# Patient Record
Sex: MALE | Race: WHITE | NOT HISPANIC OR LATINO | Employment: OTHER | URBAN - NONMETROPOLITAN AREA
[De-identification: names, ages, dates, MRNs, and addresses within clinical notes are randomized per-mention and may not be internally consistent; named-entity substitution may affect disease eponyms.]

---

## 2022-05-18 ENCOUNTER — APPOINTMENT (OUTPATIENT)
Dept: GENERAL RADIOLOGY | Facility: HOSPITAL | Age: 72
End: 2022-05-18

## 2022-05-18 ENCOUNTER — HOSPITAL ENCOUNTER (EMERGENCY)
Facility: HOSPITAL | Age: 72
Discharge: PSYCHIATRIC OR SUBSTANCE USE FACILITY | End: 2022-05-19
Attending: EMERGENCY MEDICINE | Admitting: EMERGENCY MEDICINE

## 2022-05-18 DIAGNOSIS — Z78.9 ALCOHOL USE: Primary | ICD-10-CM

## 2022-05-18 LAB
AMPHET+METHAMPHET UR QL: NEGATIVE
AMPHETAMINES UR QL: NEGATIVE
BARBITURATES UR QL SCN: NEGATIVE
BENZODIAZ UR QL SCN: NEGATIVE
BILIRUB UR QL STRIP: NEGATIVE
BUPRENORPHINE SERPL-MCNC: NEGATIVE NG/ML
CANNABINOIDS SERPL QL: NEGATIVE
CLARITY UR: CLEAR
COCAINE UR QL: NEGATIVE
COLOR UR: ABNORMAL
FLUAV RNA RESP QL NAA+PROBE: NOT DETECTED
FLUBV RNA RESP QL NAA+PROBE: NOT DETECTED
GLUCOSE UR STRIP-MCNC: NEGATIVE MG/DL
HGB UR QL STRIP.AUTO: NEGATIVE
KETONES UR QL STRIP: ABNORMAL
LEUKOCYTE ESTERASE UR QL STRIP.AUTO: NEGATIVE
METHADONE UR QL SCN: NEGATIVE
NITRITE UR QL STRIP: NEGATIVE
OPIATES UR QL: NEGATIVE
OXYCODONE UR QL SCN: NEGATIVE
PCP UR QL SCN: NEGATIVE
PH UR STRIP.AUTO: 5.5 [PH] (ref 5–8)
PROPOXYPH UR QL: NEGATIVE
PROT UR QL STRIP: NEGATIVE
SARS-COV-2 RNA RESP QL NAA+PROBE: NOT DETECTED
SP GR UR STRIP: 1.02 (ref 1–1.03)
TRICYCLICS UR QL SCN: NEGATIVE
UROBILINOGEN UR QL STRIP: ABNORMAL

## 2022-05-18 PROCEDURE — 86140 C-REACTIVE PROTEIN: CPT | Performed by: EMERGENCY MEDICINE

## 2022-05-18 PROCEDURE — 87636 SARSCOV2 & INF A&B AMP PRB: CPT | Performed by: EMERGENCY MEDICINE

## 2022-05-18 PROCEDURE — 83690 ASSAY OF LIPASE: CPT | Performed by: EMERGENCY MEDICINE

## 2022-05-18 PROCEDURE — 80143 DRUG ASSAY ACETAMINOPHEN: CPT | Performed by: EMERGENCY MEDICINE

## 2022-05-18 PROCEDURE — 84443 ASSAY THYROID STIM HORMONE: CPT | Performed by: EMERGENCY MEDICINE

## 2022-05-18 PROCEDURE — 85025 COMPLETE CBC W/AUTO DIFF WBC: CPT | Performed by: EMERGENCY MEDICINE

## 2022-05-18 PROCEDURE — 80179 DRUG ASSAY SALICYLATE: CPT | Performed by: EMERGENCY MEDICINE

## 2022-05-18 PROCEDURE — 93005 ELECTROCARDIOGRAM TRACING: CPT | Performed by: EMERGENCY MEDICINE

## 2022-05-18 PROCEDURE — 83735 ASSAY OF MAGNESIUM: CPT | Performed by: EMERGENCY MEDICINE

## 2022-05-18 PROCEDURE — 71045 X-RAY EXAM CHEST 1 VIEW: CPT

## 2022-05-18 PROCEDURE — 81003 URINALYSIS AUTO W/O SCOPE: CPT | Performed by: EMERGENCY MEDICINE

## 2022-05-18 PROCEDURE — 82077 ASSAY SPEC XCP UR&BREATH IA: CPT | Performed by: EMERGENCY MEDICINE

## 2022-05-18 PROCEDURE — 99285 EMERGENCY DEPT VISIT HI MDM: CPT

## 2022-05-18 PROCEDURE — 80306 DRUG TEST PRSMV INSTRMNT: CPT | Performed by: EMERGENCY MEDICINE

## 2022-05-18 PROCEDURE — 80053 COMPREHEN METABOLIC PANEL: CPT | Performed by: EMERGENCY MEDICINE

## 2022-05-18 PROCEDURE — 36415 COLL VENOUS BLD VENIPUNCTURE: CPT

## 2022-05-18 PROCEDURE — 85652 RBC SED RATE AUTOMATED: CPT | Performed by: EMERGENCY MEDICINE

## 2022-05-19 ENCOUNTER — APPOINTMENT (OUTPATIENT)
Dept: CT IMAGING | Facility: HOSPITAL | Age: 72
End: 2022-05-19

## 2022-05-19 ENCOUNTER — HOSPITAL ENCOUNTER (INPATIENT)
Facility: HOSPITAL | Age: 72
LOS: 5 days | Discharge: HOME | End: 2022-05-24
Attending: PSYCHIATRY & NEUROLOGY | Admitting: PSYCHIATRY & NEUROLOGY

## 2022-05-19 VITALS
HEIGHT: 70 IN | WEIGHT: 160 LBS | OXYGEN SATURATION: 98 % | DIASTOLIC BLOOD PRESSURE: 79 MMHG | BODY MASS INDEX: 22.9 KG/M2 | HEART RATE: 68 BPM | TEMPERATURE: 97.6 F | RESPIRATION RATE: 18 BRPM | SYSTOLIC BLOOD PRESSURE: 153 MMHG

## 2022-05-19 PROBLEM — F19.20 CHEMICAL DEPENDENCY: Status: ACTIVE | Noted: 2022-05-19

## 2022-05-19 LAB
ALBUMIN SERPL-MCNC: 3.52 G/DL (ref 3.5–5.2)
ALBUMIN SERPL-MCNC: 3.87 G/DL (ref 3.5–5.2)
ALBUMIN/GLOB SERPL: 1 G/DL
ALBUMIN/GLOB SERPL: 1.1 G/DL
ALP SERPL-CCNC: 100 U/L (ref 39–117)
ALP SERPL-CCNC: 93 U/L (ref 39–117)
ALT SERPL W P-5'-P-CCNC: 36 U/L (ref 1–41)
ALT SERPL W P-5'-P-CCNC: 45 U/L (ref 1–41)
ANION GAP SERPL CALCULATED.3IONS-SCNC: 11.4 MMOL/L (ref 5–15)
ANION GAP SERPL CALCULATED.3IONS-SCNC: 9.6 MMOL/L (ref 5–15)
APAP SERPL-MCNC: <5 MCG/ML (ref 0–30)
AST SERPL-CCNC: 50 U/L (ref 1–40)
AST SERPL-CCNC: 67 U/L (ref 1–40)
BASOPHILS # BLD AUTO: 0.07 10*3/MM3 (ref 0–0.2)
BASOPHILS # BLD AUTO: 0.09 10*3/MM3 (ref 0–0.2)
BASOPHILS NFR BLD AUTO: 1.2 % (ref 0–1.5)
BASOPHILS NFR BLD AUTO: 1.3 % (ref 0–1.5)
BILIRUB SERPL-MCNC: 0.4 MG/DL (ref 0–1.2)
BILIRUB SERPL-MCNC: 0.4 MG/DL (ref 0–1.2)
BUN SERPL-MCNC: 12 MG/DL (ref 8–23)
BUN SERPL-MCNC: 14 MG/DL (ref 8–23)
BUN/CREAT SERPL: 14.6 (ref 7–25)
BUN/CREAT SERPL: 17.1 (ref 7–25)
CALCIUM SPEC-SCNC: 9.8 MG/DL (ref 8.6–10.5)
CALCIUM SPEC-SCNC: 9.9 MG/DL (ref 8.6–10.5)
CHLORIDE SERPL-SCNC: 97 MMOL/L (ref 98–107)
CHLORIDE SERPL-SCNC: 98 MMOL/L (ref 98–107)
CHOLEST SERPL-MCNC: 107 MG/DL (ref 0–200)
CK MB SERPL-CCNC: 4.83 NG/ML
CK SERPL-CCNC: 104 U/L (ref 20–200)
CO2 SERPL-SCNC: 22.6 MMOL/L (ref 22–29)
CO2 SERPL-SCNC: 23.4 MMOL/L (ref 22–29)
CREAT SERPL-MCNC: 0.7 MG/DL (ref 0.76–1.27)
CREAT SERPL-MCNC: 0.96 MG/DL (ref 0.76–1.27)
CRP SERPL-MCNC: <0.3 MG/DL (ref 0–0.5)
D-LACTATE SERPL-SCNC: 1.1 MMOL/L (ref 0.5–2)
D-LACTATE SERPL-SCNC: 2.1 MMOL/L (ref 0.5–2)
DEPRECATED RDW RBC AUTO: 46.3 FL (ref 37–54)
DEPRECATED RDW RBC AUTO: 46.8 FL (ref 37–54)
EGFRCR SERPLBLD CKD-EPI 2021: 82.9 ML/MIN/1.73
EGFRCR SERPLBLD CKD-EPI 2021: 96.7 ML/MIN/1.73
EOSINOPHIL # BLD AUTO: 0.09 10*3/MM3 (ref 0–0.4)
EOSINOPHIL # BLD AUTO: 0.18 10*3/MM3 (ref 0–0.4)
EOSINOPHIL NFR BLD AUTO: 1.5 % (ref 0.3–6.2)
EOSINOPHIL NFR BLD AUTO: 2.7 % (ref 0.3–6.2)
ERYTHROCYTE [DISTWIDTH] IN BLOOD BY AUTOMATED COUNT: 14.9 % (ref 12.3–15.4)
ERYTHROCYTE [DISTWIDTH] IN BLOOD BY AUTOMATED COUNT: 15 % (ref 12.3–15.4)
ERYTHROCYTE [SEDIMENTATION RATE] IN BLOOD: 19 MM/HR (ref 0–20)
ETHANOL BLD-MCNC: <10 MG/DL (ref 0–10)
ETHANOL UR QL: <0.01 %
GLOBULIN UR ELPH-MCNC: 3.3 GM/DL
GLOBULIN UR ELPH-MCNC: 3.7 GM/DL
GLUCOSE BLDC GLUCOMTR-MCNC: 182 MG/DL (ref 70–130)
GLUCOSE SERPL-MCNC: 144 MG/DL (ref 65–99)
GLUCOSE SERPL-MCNC: 99 MG/DL (ref 65–99)
HBA1C MFR BLD: 5.3 % (ref 4.8–5.6)
HCT VFR BLD AUTO: 40.1 % (ref 37.5–51)
HCT VFR BLD AUTO: 44.8 % (ref 37.5–51)
HDLC SERPL-MCNC: 57 MG/DL (ref 40–60)
HGB BLD-MCNC: 13.3 G/DL (ref 13–17.7)
HGB BLD-MCNC: 14.6 G/DL (ref 13–17.7)
HOLD SPECIMEN: NORMAL
HOLD SPECIMEN: NORMAL
IMM GRANULOCYTES # BLD AUTO: 0.02 10*3/MM3 (ref 0–0.05)
IMM GRANULOCYTES # BLD AUTO: 0.02 10*3/MM3 (ref 0–0.05)
IMM GRANULOCYTES NFR BLD AUTO: 0.3 % (ref 0–0.5)
IMM GRANULOCYTES NFR BLD AUTO: 0.3 % (ref 0–0.5)
LDLC SERPL CALC-MCNC: 39 MG/DL (ref 0–100)
LDLC/HDLC SERPL: 0.74 {RATIO}
LIPASE SERPL-CCNC: 20 U/L (ref 13–60)
LYMPHOCYTES # BLD AUTO: 0.83 10*3/MM3 (ref 0.7–3.1)
LYMPHOCYTES # BLD AUTO: 1.11 10*3/MM3 (ref 0.7–3.1)
LYMPHOCYTES NFR BLD AUTO: 13.9 % (ref 19.6–45.3)
LYMPHOCYTES NFR BLD AUTO: 16.6 % (ref 19.6–45.3)
MAGNESIUM SERPL-MCNC: 2 MG/DL (ref 1.6–2.4)
MCH RBC QN AUTO: 28 PG (ref 26.6–33)
MCH RBC QN AUTO: 28.2 PG (ref 26.6–33)
MCHC RBC AUTO-ENTMCNC: 32.6 G/DL (ref 31.5–35.7)
MCHC RBC AUTO-ENTMCNC: 33.2 G/DL (ref 31.5–35.7)
MCV RBC AUTO: 85.1 FL (ref 79–97)
MCV RBC AUTO: 85.8 FL (ref 79–97)
MONOCYTES # BLD AUTO: 0.64 10*3/MM3 (ref 0.1–0.9)
MONOCYTES # BLD AUTO: 0.69 10*3/MM3 (ref 0.1–0.9)
MONOCYTES NFR BLD AUTO: 11.5 % (ref 5–12)
MONOCYTES NFR BLD AUTO: 9.6 % (ref 5–12)
MYOGLOBIN SERPL-MCNC: 40.4 NG/ML (ref 28–72)
NEUTROPHILS NFR BLD AUTO: 4.28 10*3/MM3 (ref 1.7–7)
NEUTROPHILS NFR BLD AUTO: 4.65 10*3/MM3 (ref 1.7–7)
NEUTROPHILS NFR BLD AUTO: 69.5 % (ref 42.7–76)
NEUTROPHILS NFR BLD AUTO: 71.6 % (ref 42.7–76)
NRBC BLD AUTO-RTO: 0 /100 WBC (ref 0–0.2)
NRBC BLD AUTO-RTO: 0 /100 WBC (ref 0–0.2)
NT-PROBNP SERPL-MCNC: 514.5 PG/ML (ref 0–900)
PLATELET # BLD AUTO: 159 10*3/MM3 (ref 140–450)
PLATELET # BLD AUTO: 182 10*3/MM3 (ref 140–450)
PMV BLD AUTO: 10.3 FL (ref 6–12)
PMV BLD AUTO: 9.6 FL (ref 6–12)
POTASSIUM SERPL-SCNC: 4 MMOL/L (ref 3.5–5.2)
POTASSIUM SERPL-SCNC: 4.4 MMOL/L (ref 3.5–5.2)
PROT SERPL-MCNC: 6.8 G/DL (ref 6–8.5)
PROT SERPL-MCNC: 7.6 G/DL (ref 6–8.5)
RBC # BLD AUTO: 4.71 10*6/MM3 (ref 4.14–5.8)
RBC # BLD AUTO: 5.22 10*6/MM3 (ref 4.14–5.8)
SALICYLATES SERPL-MCNC: <0.3 MG/DL
SODIUM SERPL-SCNC: 131 MMOL/L (ref 136–145)
SODIUM SERPL-SCNC: 131 MMOL/L (ref 136–145)
TRIGL SERPL-MCNC: 38 MG/DL (ref 0–150)
TROPONIN T SERPL-MCNC: <0.01 NG/ML (ref 0–0.03)
TSH SERPL DL<=0.05 MIU/L-ACNC: 2.12 UIU/ML (ref 0.27–4.2)
VLDLC SERPL-MCNC: 11 MG/DL (ref 5–40)
WBC NRBC COR # BLD: 5.98 10*3/MM3 (ref 3.4–10.8)
WBC NRBC COR # BLD: 6.69 10*3/MM3 (ref 3.4–10.8)
WHOLE BLOOD HOLD COAG: NORMAL
WHOLE BLOOD HOLD SPECIMEN: NORMAL

## 2022-05-19 PROCEDURE — 94799 UNLISTED PULMONARY SVC/PX: CPT

## 2022-05-19 PROCEDURE — 80053 COMPREHEN METABOLIC PANEL: CPT | Performed by: PHYSICIAN ASSISTANT

## 2022-05-19 PROCEDURE — 63710000001 ONDANSETRON ODT 4 MG TABLET DISPERSIBLE: Performed by: EMERGENCY MEDICINE

## 2022-05-19 PROCEDURE — 84484 ASSAY OF TROPONIN QUANT: CPT | Performed by: PHYSICIAN ASSISTANT

## 2022-05-19 PROCEDURE — 99222 1ST HOSP IP/OBS MODERATE 55: CPT | Performed by: NURSE PRACTITIONER

## 2022-05-19 PROCEDURE — 93010 ELECTROCARDIOGRAM REPORT: CPT | Performed by: SPECIALIST

## 2022-05-19 PROCEDURE — 83880 ASSAY OF NATRIURETIC PEPTIDE: CPT | Performed by: PSYCHIATRY & NEUROLOGY

## 2022-05-19 PROCEDURE — 99223 1ST HOSP IP/OBS HIGH 75: CPT | Performed by: PSYCHIATRY & NEUROLOGY

## 2022-05-19 PROCEDURE — 84484 ASSAY OF TROPONIN QUANT: CPT | Performed by: PSYCHIATRY & NEUROLOGY

## 2022-05-19 PROCEDURE — 93005 ELECTROCARDIOGRAM TRACING: CPT | Performed by: PSYCHIATRY & NEUROLOGY

## 2022-05-19 PROCEDURE — 70450 CT HEAD/BRAIN W/O DYE: CPT | Performed by: RADIOLOGY

## 2022-05-19 PROCEDURE — 86141 C-REACTIVE PROTEIN HS: CPT | Performed by: PSYCHIATRY & NEUROLOGY

## 2022-05-19 PROCEDURE — 83605 ASSAY OF LACTIC ACID: CPT | Performed by: PHYSICIAN ASSISTANT

## 2022-05-19 PROCEDURE — 99223 1ST HOSP IP/OBS HIGH 75: CPT | Performed by: PHYSICIAN ASSISTANT

## 2022-05-19 PROCEDURE — 70450 CT HEAD/BRAIN W/O DYE: CPT

## 2022-05-19 PROCEDURE — 63710000001 ONDANSETRON PER 8 MG: Performed by: PSYCHIATRY & NEUROLOGY

## 2022-05-19 PROCEDURE — 82962 GLUCOSE BLOOD TEST: CPT

## 2022-05-19 PROCEDURE — 82550 ASSAY OF CK (CPK): CPT | Performed by: PSYCHIATRY & NEUROLOGY

## 2022-05-19 PROCEDURE — 83036 HEMOGLOBIN GLYCOSYLATED A1C: CPT | Performed by: PHYSICIAN ASSISTANT

## 2022-05-19 PROCEDURE — 83874 ASSAY OF MYOGLOBIN: CPT | Performed by: PSYCHIATRY & NEUROLOGY

## 2022-05-19 PROCEDURE — 85025 COMPLETE CBC W/AUTO DIFF WBC: CPT | Performed by: PHYSICIAN ASSISTANT

## 2022-05-19 PROCEDURE — 82553 CREATINE MB FRACTION: CPT | Performed by: PSYCHIATRY & NEUROLOGY

## 2022-05-19 PROCEDURE — HZ2ZZZZ DETOXIFICATION SERVICES FOR SUBSTANCE ABUSE TREATMENT: ICD-10-PCS | Performed by: PSYCHIATRY & NEUROLOGY

## 2022-05-19 PROCEDURE — 80061 LIPID PANEL: CPT | Performed by: PHYSICIAN ASSISTANT

## 2022-05-19 RX ORDER — FAMOTIDINE 20 MG/1
20 TABLET, FILM COATED ORAL
Status: DISCONTINUED | OUTPATIENT
Start: 2022-05-19 | End: 2022-05-24 | Stop reason: HOSPADM

## 2022-05-19 RX ORDER — ONDANSETRON 2 MG/ML
4 INJECTION INTRAMUSCULAR; INTRAVENOUS ONCE
Status: DISCONTINUED | OUTPATIENT
Start: 2022-05-19 | End: 2022-05-24 | Stop reason: HOSPADM

## 2022-05-19 RX ORDER — LORAZEPAM 2 MG/1
2 TABLET ORAL
Status: DISPENSED | OUTPATIENT
Start: 2022-05-19 | End: 2022-05-20

## 2022-05-19 RX ORDER — ASPIRIN 325 MG
325 TABLET, DELAYED RELEASE (ENTERIC COATED) ORAL ONCE
Status: COMPLETED | OUTPATIENT
Start: 2022-05-19 | End: 2022-05-19

## 2022-05-19 RX ORDER — IBUPROFEN 400 MG/1
400 TABLET ORAL EVERY 6 HOURS PRN
Status: DISCONTINUED | OUTPATIENT
Start: 2022-05-19 | End: 2022-05-24 | Stop reason: HOSPADM

## 2022-05-19 RX ORDER — LORAZEPAM 1 MG/1
1 TABLET ORAL EVERY 4 HOURS PRN
Status: ACTIVE | OUTPATIENT
Start: 2022-05-22 | End: 2022-05-23

## 2022-05-19 RX ORDER — LORAZEPAM 0.5 MG/1
0.5 TABLET ORAL EVERY 4 HOURS PRN
Status: ACTIVE | OUTPATIENT
Start: 2022-05-23 | End: 2022-05-24

## 2022-05-19 RX ORDER — NITROGLYCERIN 0.4 MG/1
0.4 TABLET SUBLINGUAL
Status: DISCONTINUED | OUTPATIENT
Start: 2022-05-19 | End: 2022-05-24 | Stop reason: HOSPADM

## 2022-05-19 RX ORDER — PHENOBARBITAL, HYOSCYAMINE SULFATE, ATROPINE SULFATE AND SCOPOLAMINE HYDROBROMIDE .0194; .1037; 16.2; .0065 MG/1; MG/1; MG/1; MG/1
2 TABLET ORAL ONCE
Status: COMPLETED | OUTPATIENT
Start: 2022-05-19 | End: 2022-05-19

## 2022-05-19 RX ORDER — TRAZODONE HYDROCHLORIDE 50 MG/1
50 TABLET ORAL NIGHTLY PRN
Status: DISCONTINUED | OUTPATIENT
Start: 2022-05-19 | End: 2022-05-24 | Stop reason: HOSPADM

## 2022-05-19 RX ORDER — ATORVASTATIN CALCIUM 40 MG/1
40 TABLET, FILM COATED ORAL NIGHTLY
Status: DISCONTINUED | OUTPATIENT
Start: 2022-05-19 | End: 2022-05-24 | Stop reason: HOSPADM

## 2022-05-19 RX ORDER — CLOPIDOGREL BISULFATE 75 MG/1
75 TABLET ORAL DAILY
Status: DISCONTINUED | OUTPATIENT
Start: 2022-05-19 | End: 2022-05-24 | Stop reason: HOSPADM

## 2022-05-19 RX ORDER — ALUMINA, MAGNESIA, AND SIMETHICONE 2400; 2400; 240 MG/30ML; MG/30ML; MG/30ML
15 SUSPENSION ORAL EVERY 6 HOURS PRN
Status: DISCONTINUED | OUTPATIENT
Start: 2022-05-19 | End: 2022-05-24 | Stop reason: HOSPADM

## 2022-05-19 RX ORDER — FAMOTIDINE 20 MG/1
20 TABLET, FILM COATED ORAL 2 TIMES DAILY PRN
Status: DISCONTINUED | OUTPATIENT
Start: 2022-05-19 | End: 2022-05-19

## 2022-05-19 RX ORDER — LORAZEPAM 1 MG/1
1 TABLET ORAL
Status: COMPLETED | OUTPATIENT
Start: 2022-05-22 | End: 2022-05-22

## 2022-05-19 RX ORDER — KETOROLAC TROMETHAMINE 10 MG/1
10 TABLET, FILM COATED ORAL ONCE
Status: DISCONTINUED | OUTPATIENT
Start: 2022-05-19 | End: 2022-05-19 | Stop reason: HOSPADM

## 2022-05-19 RX ORDER — LOPERAMIDE HYDROCHLORIDE 2 MG/1
2 CAPSULE ORAL
Status: DISCONTINUED | OUTPATIENT
Start: 2022-05-19 | End: 2022-05-24 | Stop reason: HOSPADM

## 2022-05-19 RX ORDER — LORAZEPAM 2 MG/1
2 TABLET ORAL
Status: COMPLETED | OUTPATIENT
Start: 2022-05-20 | End: 2022-05-20

## 2022-05-19 RX ORDER — MULTIVITAMIN WITH IRON
2 TABLET ORAL DAILY
Status: DISCONTINUED | OUTPATIENT
Start: 2022-05-19 | End: 2022-05-24 | Stop reason: HOSPADM

## 2022-05-19 RX ORDER — LORAZEPAM 2 MG/1
2 TABLET ORAL EVERY 4 HOURS PRN
Status: ACTIVE | OUTPATIENT
Start: 2022-05-20 | End: 2022-05-21

## 2022-05-19 RX ORDER — LIDOCAINE HYDROCHLORIDE 20 MG/ML
15 SOLUTION OROPHARYNGEAL ONCE
Status: COMPLETED | OUTPATIENT
Start: 2022-05-19 | End: 2022-05-19

## 2022-05-19 RX ORDER — ONDANSETRON 4 MG/1
4 TABLET, ORALLY DISINTEGRATING ORAL ONCE
Status: COMPLETED | OUTPATIENT
Start: 2022-05-19 | End: 2022-05-19

## 2022-05-19 RX ORDER — ECHINACEA PURPUREA EXTRACT 125 MG
2 TABLET ORAL AS NEEDED
Status: DISCONTINUED | OUTPATIENT
Start: 2022-05-19 | End: 2022-05-24 | Stop reason: HOSPADM

## 2022-05-19 RX ORDER — ONDANSETRON 4 MG/1
4 TABLET, FILM COATED ORAL EVERY 6 HOURS PRN
Status: DISCONTINUED | OUTPATIENT
Start: 2022-05-19 | End: 2022-05-24 | Stop reason: HOSPADM

## 2022-05-19 RX ORDER — LORAZEPAM 0.5 MG/1
0.5 TABLET ORAL
Status: COMPLETED | OUTPATIENT
Start: 2022-05-23 | End: 2022-05-23

## 2022-05-19 RX ORDER — HYDROXYZINE 50 MG/1
50 TABLET, FILM COATED ORAL EVERY 6 HOURS PRN
Status: DISCONTINUED | OUTPATIENT
Start: 2022-05-19 | End: 2022-05-24 | Stop reason: HOSPADM

## 2022-05-19 RX ORDER — ASPIRIN 81 MG/1
81 TABLET ORAL DAILY
Status: DISCONTINUED | OUTPATIENT
Start: 2022-05-20 | End: 2022-05-19

## 2022-05-19 RX ORDER — ALUMINA, MAGNESIA, AND SIMETHICONE 2400; 2400; 240 MG/30ML; MG/30ML; MG/30ML
15 SUSPENSION ORAL ONCE
Status: COMPLETED | OUTPATIENT
Start: 2022-05-19 | End: 2022-05-19

## 2022-05-19 RX ORDER — ACETAMINOPHEN 500 MG
1000 TABLET ORAL EVERY 8 HOURS
Status: DISCONTINUED | OUTPATIENT
Start: 2022-05-19 | End: 2022-05-24 | Stop reason: HOSPADM

## 2022-05-19 RX ORDER — NICOTINE 21 MG/24HR
1 PATCH, TRANSDERMAL 24 HOURS TRANSDERMAL
Status: DISCONTINUED | OUTPATIENT
Start: 2022-05-19 | End: 2022-05-24 | Stop reason: HOSPADM

## 2022-05-19 RX ORDER — BENZONATATE 100 MG/1
100 CAPSULE ORAL 3 TIMES DAILY PRN
Status: DISCONTINUED | OUTPATIENT
Start: 2022-05-19 | End: 2022-05-24 | Stop reason: HOSPADM

## 2022-05-19 RX ORDER — ONDANSETRON 2 MG/ML
4 INJECTION INTRAMUSCULAR; INTRAVENOUS EVERY 6 HOURS PRN
Status: DISCONTINUED | OUTPATIENT
Start: 2022-05-19 | End: 2022-05-24 | Stop reason: HOSPADM

## 2022-05-19 RX ADMIN — IBUPROFEN 400 MG: 400 TABLET, FILM COATED ORAL at 02:52

## 2022-05-19 RX ADMIN — ALUMINUM HYDROXIDE, MAGNESIUM HYDROXIDE, AND DIMETHICONE 15 ML: 400; 400; 40 SUSPENSION ORAL at 16:12

## 2022-05-19 RX ADMIN — ALUMINUM HYDROXIDE, MAGNESIUM HYDROXIDE, AND DIMETHICONE 15 ML: 400; 400; 40 SUSPENSION ORAL at 21:31

## 2022-05-19 RX ADMIN — METOPROLOL TARTRATE 25 MG: 25 TABLET, FILM COATED ORAL at 17:12

## 2022-05-19 RX ADMIN — LORAZEPAM 2 MG: 2 TABLET ORAL at 08:05

## 2022-05-19 RX ADMIN — LORAZEPAM 2 MG: 2 TABLET ORAL at 18:06

## 2022-05-19 RX ADMIN — LORAZEPAM 2 MG: 2 TABLET ORAL at 02:52

## 2022-05-19 RX ADMIN — ASPIRIN 325 MG: 325 TABLET, COATED ORAL at 14:30

## 2022-05-19 RX ADMIN — SODIUM CHLORIDE 1000 ML: 9 INJECTION, SOLUTION INTRAVENOUS at 14:45

## 2022-05-19 RX ADMIN — NITROGLYCERIN 0.4 MG: 0.4 TABLET, ORALLY DISINTEGRATING SUBLINGUAL at 13:47

## 2022-05-19 RX ADMIN — IBUPROFEN 400 MG: 400 TABLET, FILM COATED ORAL at 21:31

## 2022-05-19 RX ADMIN — TRAZODONE HYDROCHLORIDE 50 MG: 50 TABLET ORAL at 21:31

## 2022-05-19 RX ADMIN — LIDOCAINE HYDROCHLORIDE 15 ML: 20 SOLUTION ORAL; TOPICAL at 16:12

## 2022-05-19 RX ADMIN — HYDROXYZINE HYDROCHLORIDE 50 MG: 50 TABLET ORAL at 21:31

## 2022-05-19 RX ADMIN — FAMOTIDINE 20 MG: 20 TABLET, FILM COATED ORAL at 17:13

## 2022-05-19 RX ADMIN — ONDANSETRON HYDROCHLORIDE 4 MG: 4 TABLET, FILM COATED ORAL at 08:06

## 2022-05-19 RX ADMIN — ACETAMINOPHEN 1000 MG: 500 TABLET ORAL at 16:12

## 2022-05-19 RX ADMIN — LORAZEPAM 2 MG: 2 TABLET ORAL at 12:56

## 2022-05-19 RX ADMIN — ONDANSETRON 4 MG: 4 TABLET, ORALLY DISINTEGRATING ORAL at 01:10

## 2022-05-19 RX ADMIN — ATORVASTATIN CALCIUM 40 MG: 40 TABLET, FILM COATED ORAL at 21:32

## 2022-05-19 RX ADMIN — CLOPIDOGREL 75 MG: 75 TABLET, FILM COATED ORAL at 17:13

## 2022-05-19 RX ADMIN — Medication 100 MG: at 08:06

## 2022-05-19 RX ADMIN — PHENOBARBITAL, HYOSCYAMINE SULFATE, ATROPINE SULFATE, SCOPOLAMINE HYDROBROMIDE 32.4 MG: 16.2; .1037; .0194; .0065 TABLET ORAL at 16:12

## 2022-05-19 RX ADMIN — Medication 2 TABLET: at 08:05

## 2022-05-20 LAB
CRP SERPL-MCNC: 0.27 MG/DL (ref 0.01–0.5)
QT INTERVAL: 378 MS
QT INTERVAL: 430 MS
QTC INTERVAL: 449 MS
QTC INTERVAL: 454 MS

## 2022-05-20 PROCEDURE — 99232 SBSQ HOSP IP/OBS MODERATE 35: CPT | Performed by: PHYSICIAN ASSISTANT

## 2022-05-20 PROCEDURE — 99232 SBSQ HOSP IP/OBS MODERATE 35: CPT | Performed by: PSYCHIATRY & NEUROLOGY

## 2022-05-20 RX ORDER — BUDESONIDE AND FORMOTEROL FUMARATE DIHYDRATE 160; 4.5 UG/1; UG/1
2 AEROSOL RESPIRATORY (INHALATION)
Status: DISCONTINUED | OUTPATIENT
Start: 2022-05-20 | End: 2022-05-24 | Stop reason: HOSPADM

## 2022-05-20 RX ADMIN — BUDESONIDE AND FORMOTEROL FUMARATE DIHYDRATE 2 PUFF: 160; 4.5 AEROSOL RESPIRATORY (INHALATION) at 16:09

## 2022-05-20 RX ADMIN — ACETAMINOPHEN 1000 MG: 500 TABLET ORAL at 08:57

## 2022-05-20 RX ADMIN — Medication 2 TABLET: at 08:56

## 2022-05-20 RX ADMIN — ACETAMINOPHEN 1000 MG: 500 TABLET ORAL at 16:26

## 2022-05-20 RX ADMIN — BUDESONIDE AND FORMOTEROL FUMARATE DIHYDRATE 2 PUFF: 160; 4.5 AEROSOL RESPIRATORY (INHALATION) at 22:39

## 2022-05-20 RX ADMIN — LORAZEPAM 2 MG: 2 TABLET ORAL at 14:06

## 2022-05-20 RX ADMIN — CLOPIDOGREL 75 MG: 75 TABLET, FILM COATED ORAL at 08:56

## 2022-05-20 RX ADMIN — LORAZEPAM 2 MG: 2 TABLET ORAL at 08:56

## 2022-05-20 RX ADMIN — FAMOTIDINE 20 MG: 20 TABLET, FILM COATED ORAL at 08:56

## 2022-05-20 RX ADMIN — Medication 100 MG: at 08:56

## 2022-05-20 RX ADMIN — ATORVASTATIN CALCIUM 40 MG: 40 TABLET, FILM COATED ORAL at 22:39

## 2022-05-20 RX ADMIN — METOPROLOL TARTRATE 25 MG: 25 TABLET, FILM COATED ORAL at 08:56

## 2022-05-20 RX ADMIN — Medication 1 PATCH: at 08:59

## 2022-05-20 RX ADMIN — LORAZEPAM 2 MG: 2 TABLET ORAL at 22:39

## 2022-05-20 RX ADMIN — METOPROLOL TARTRATE 25 MG: 25 TABLET, FILM COATED ORAL at 22:39

## 2022-05-21 PROCEDURE — 63710000001 ONDANSETRON PER 8 MG: Performed by: PSYCHIATRY & NEUROLOGY

## 2022-05-21 PROCEDURE — 99231 SBSQ HOSP IP/OBS SF/LOW 25: CPT | Performed by: PSYCHIATRY & NEUROLOGY

## 2022-05-21 RX ORDER — ALBUTEROL SULFATE 90 UG/1
2 AEROSOL, METERED RESPIRATORY (INHALATION) EVERY 6 HOURS PRN
Status: DISCONTINUED | OUTPATIENT
Start: 2022-05-21 | End: 2022-05-24 | Stop reason: HOSPADM

## 2022-05-21 RX ADMIN — Medication 100 MG: at 08:22

## 2022-05-21 RX ADMIN — Medication 2 TABLET: at 08:22

## 2022-05-21 RX ADMIN — FAMOTIDINE 20 MG: 20 TABLET, FILM COATED ORAL at 16:38

## 2022-05-21 RX ADMIN — METOPROLOL TARTRATE 25 MG: 25 TABLET, FILM COATED ORAL at 21:45

## 2022-05-21 RX ADMIN — FAMOTIDINE 20 MG: 20 TABLET, FILM COATED ORAL at 08:22

## 2022-05-21 RX ADMIN — LORAZEPAM 1.5 MG: 1 TABLET ORAL at 08:22

## 2022-05-21 RX ADMIN — METOPROLOL TARTRATE 25 MG: 25 TABLET, FILM COATED ORAL at 08:22

## 2022-05-21 RX ADMIN — CLOPIDOGREL 75 MG: 75 TABLET, FILM COATED ORAL at 08:22

## 2022-05-21 RX ADMIN — LORAZEPAM 1.5 MG: 1 TABLET ORAL at 14:39

## 2022-05-21 RX ADMIN — ACETAMINOPHEN 1000 MG: 500 TABLET ORAL at 08:22

## 2022-05-21 RX ADMIN — ALBUTEROL SULFATE 2 PUFF: 90 AEROSOL, METERED RESPIRATORY (INHALATION) at 20:13

## 2022-05-21 RX ADMIN — LORAZEPAM 1.5 MG: 1 TABLET ORAL at 21:45

## 2022-05-21 RX ADMIN — BUDESONIDE AND FORMOTEROL FUMARATE DIHYDRATE 2 PUFF: 160; 4.5 AEROSOL RESPIRATORY (INHALATION) at 21:45

## 2022-05-21 RX ADMIN — ATORVASTATIN CALCIUM 40 MG: 40 TABLET, FILM COATED ORAL at 21:45

## 2022-05-21 RX ADMIN — BUDESONIDE AND FORMOTEROL FUMARATE DIHYDRATE 2 PUFF: 160; 4.5 AEROSOL RESPIRATORY (INHALATION) at 10:03

## 2022-05-21 RX ADMIN — ONDANSETRON HYDROCHLORIDE 4 MG: 4 TABLET, FILM COATED ORAL at 18:03

## 2022-05-21 RX ADMIN — ACETAMINOPHEN 1000 MG: 500 TABLET ORAL at 00:52

## 2022-05-21 RX ADMIN — ACETAMINOPHEN 1000 MG: 500 TABLET ORAL at 16:27

## 2022-05-22 PROCEDURE — 99232 SBSQ HOSP IP/OBS MODERATE 35: CPT | Performed by: PSYCHIATRY & NEUROLOGY

## 2022-05-22 RX ORDER — PANTOPRAZOLE SODIUM 40 MG/1
40 TABLET, DELAYED RELEASE ORAL
Status: DISCONTINUED | OUTPATIENT
Start: 2022-05-23 | End: 2022-05-24 | Stop reason: HOSPADM

## 2022-05-22 RX ADMIN — ALBUTEROL SULFATE 2 PUFF: 90 AEROSOL, METERED RESPIRATORY (INHALATION) at 13:09

## 2022-05-22 RX ADMIN — HYDROXYZINE HYDROCHLORIDE 50 MG: 50 TABLET ORAL at 16:38

## 2022-05-22 RX ADMIN — LORAZEPAM 1.5 MG: 1 TABLET ORAL at 02:44

## 2022-05-22 RX ADMIN — BUDESONIDE AND FORMOTEROL FUMARATE DIHYDRATE 2 PUFF: 160; 4.5 AEROSOL RESPIRATORY (INHALATION) at 08:32

## 2022-05-22 RX ADMIN — LORAZEPAM 1 MG: 1 TABLET ORAL at 21:39

## 2022-05-22 RX ADMIN — FAMOTIDINE 20 MG: 20 TABLET, FILM COATED ORAL at 17:16

## 2022-05-22 RX ADMIN — METOPROLOL TARTRATE 25 MG: 25 TABLET, FILM COATED ORAL at 21:39

## 2022-05-22 RX ADMIN — LORAZEPAM 1 MG: 1 TABLET ORAL at 08:30

## 2022-05-22 RX ADMIN — ATORVASTATIN CALCIUM 40 MG: 40 TABLET, FILM COATED ORAL at 21:40

## 2022-05-22 RX ADMIN — FAMOTIDINE 20 MG: 20 TABLET, FILM COATED ORAL at 08:30

## 2022-05-22 RX ADMIN — BUDESONIDE AND FORMOTEROL FUMARATE DIHYDRATE 2 PUFF: 160; 4.5 AEROSOL RESPIRATORY (INHALATION) at 21:40

## 2022-05-22 RX ADMIN — ACETAMINOPHEN 1000 MG: 500 TABLET ORAL at 15:36

## 2022-05-22 RX ADMIN — ACETAMINOPHEN 1000 MG: 500 TABLET ORAL at 08:30

## 2022-05-22 RX ADMIN — Medication 100 MG: at 08:30

## 2022-05-22 RX ADMIN — CLOPIDOGREL 75 MG: 75 TABLET, FILM COATED ORAL at 08:30

## 2022-05-22 RX ADMIN — LORAZEPAM 1 MG: 1 TABLET ORAL at 14:39

## 2022-05-22 RX ADMIN — Medication 2 TABLET: at 08:30

## 2022-05-23 PROCEDURE — 99232 SBSQ HOSP IP/OBS MODERATE 35: CPT | Performed by: PSYCHIATRY & NEUROLOGY

## 2022-05-23 RX ADMIN — TRAZODONE HYDROCHLORIDE 50 MG: 50 TABLET ORAL at 21:14

## 2022-05-23 RX ADMIN — Medication 2 TABLET: at 08:14

## 2022-05-23 RX ADMIN — FAMOTIDINE 20 MG: 20 TABLET, FILM COATED ORAL at 16:35

## 2022-05-23 RX ADMIN — LORAZEPAM 0.5 MG: 0.5 TABLET ORAL at 08:14

## 2022-05-23 RX ADMIN — HYDROXYZINE HYDROCHLORIDE 50 MG: 50 TABLET ORAL at 21:14

## 2022-05-23 RX ADMIN — FAMOTIDINE 20 MG: 20 TABLET, FILM COATED ORAL at 08:14

## 2022-05-23 RX ADMIN — Medication 100 MG: at 08:14

## 2022-05-23 RX ADMIN — ACETAMINOPHEN 1000 MG: 500 TABLET ORAL at 03:01

## 2022-05-23 RX ADMIN — BUDESONIDE AND FORMOTEROL FUMARATE DIHYDRATE 2 PUFF: 160; 4.5 AEROSOL RESPIRATORY (INHALATION) at 08:30

## 2022-05-23 RX ADMIN — ACETAMINOPHEN 1000 MG: 500 TABLET ORAL at 08:13

## 2022-05-23 RX ADMIN — LORAZEPAM 0.5 MG: 0.5 TABLET ORAL at 21:14

## 2022-05-23 RX ADMIN — ACETAMINOPHEN 1000 MG: 500 TABLET ORAL at 23:53

## 2022-05-23 RX ADMIN — PANTOPRAZOLE SODIUM 40 MG: 40 TABLET, DELAYED RELEASE ORAL at 05:38

## 2022-05-23 RX ADMIN — BUDESONIDE AND FORMOTEROL FUMARATE DIHYDRATE 2 PUFF: 160; 4.5 AEROSOL RESPIRATORY (INHALATION) at 21:16

## 2022-05-23 RX ADMIN — METOPROLOL TARTRATE 25 MG: 25 TABLET, FILM COATED ORAL at 08:14

## 2022-05-23 RX ADMIN — IBUPROFEN 400 MG: 400 TABLET, FILM COATED ORAL at 21:14

## 2022-05-23 RX ADMIN — CLOPIDOGREL 75 MG: 75 TABLET, FILM COATED ORAL at 08:13

## 2022-05-23 RX ADMIN — ACETAMINOPHEN 1000 MG: 500 TABLET ORAL at 15:18

## 2022-05-23 RX ADMIN — METOPROLOL TARTRATE 25 MG: 25 TABLET, FILM COATED ORAL at 21:12

## 2022-05-23 RX ADMIN — ATORVASTATIN CALCIUM 40 MG: 40 TABLET, FILM COATED ORAL at 21:12

## 2022-05-23 RX ADMIN — LORAZEPAM 0.5 MG: 0.5 TABLET ORAL at 14:25

## 2022-05-24 VITALS
HEART RATE: 61 BPM | OXYGEN SATURATION: 95 % | SYSTOLIC BLOOD PRESSURE: 117 MMHG | TEMPERATURE: 98 F | DIASTOLIC BLOOD PRESSURE: 52 MMHG | RESPIRATION RATE: 18 BRPM | HEIGHT: 70 IN | BODY MASS INDEX: 25.43 KG/M2 | WEIGHT: 177.6 LBS

## 2022-05-24 PROBLEM — F10.20 ALCOHOL USE DISORDER, SEVERE, DEPENDENCE: Status: ACTIVE | Noted: 2022-05-19

## 2022-05-24 PROCEDURE — 99238 HOSP IP/OBS DSCHRG MGMT 30/<: CPT | Performed by: PSYCHIATRY & NEUROLOGY

## 2022-05-24 RX ORDER — ATORVASTATIN CALCIUM 40 MG/1
40 TABLET, FILM COATED ORAL NIGHTLY
Qty: 30 TABLET | Refills: 0 | Status: SHIPPED | OUTPATIENT
Start: 2022-05-24

## 2022-05-24 RX ORDER — ALBUTEROL SULFATE 90 UG/1
2 AEROSOL, METERED RESPIRATORY (INHALATION) EVERY 6 HOURS PRN
Qty: 6.7 G | Refills: 0 | Status: SHIPPED | OUTPATIENT
Start: 2022-05-24

## 2022-05-24 RX ORDER — PANTOPRAZOLE SODIUM 40 MG/1
40 TABLET, DELAYED RELEASE ORAL
Qty: 30 TABLET | Refills: 0 | Status: SHIPPED | OUTPATIENT
Start: 2022-05-25

## 2022-05-24 RX ORDER — CLOPIDOGREL BISULFATE 75 MG/1
75 TABLET ORAL DAILY
Qty: 30 TABLET | Refills: 0 | Status: SHIPPED | OUTPATIENT
Start: 2022-05-25

## 2022-05-24 RX ORDER — BUDESONIDE AND FORMOTEROL FUMARATE DIHYDRATE 160; 4.5 UG/1; UG/1
2 AEROSOL RESPIRATORY (INHALATION)
Qty: 10.2 G | Refills: 0 | Status: SHIPPED | OUTPATIENT
Start: 2022-05-24

## 2022-05-24 RX ADMIN — PANTOPRAZOLE SODIUM 40 MG: 40 TABLET, DELAYED RELEASE ORAL at 06:14

## 2022-05-24 RX ADMIN — METOPROLOL TARTRATE 25 MG: 25 TABLET, FILM COATED ORAL at 08:22

## 2022-05-24 RX ADMIN — BUDESONIDE AND FORMOTEROL FUMARATE DIHYDRATE 2 PUFF: 160; 4.5 AEROSOL RESPIRATORY (INHALATION) at 08:31

## 2022-05-24 RX ADMIN — FAMOTIDINE 20 MG: 20 TABLET, FILM COATED ORAL at 16:38

## 2022-05-24 RX ADMIN — Medication 2 TABLET: at 08:22

## 2022-05-24 RX ADMIN — Medication 100 MG: at 08:22

## 2022-05-24 RX ADMIN — CLOPIDOGREL 75 MG: 75 TABLET, FILM COATED ORAL at 08:21

## 2022-05-24 RX ADMIN — ACETAMINOPHEN 1000 MG: 500 TABLET ORAL at 15:05

## 2022-05-24 RX ADMIN — HYDROXYZINE HYDROCHLORIDE 50 MG: 50 TABLET ORAL at 08:22

## 2022-05-24 RX ADMIN — FAMOTIDINE 20 MG: 20 TABLET, FILM COATED ORAL at 06:41

## 2022-05-24 RX ADMIN — ACETAMINOPHEN 1000 MG: 500 TABLET ORAL at 08:21

## 2024-03-07 PROCEDURE — 85610 PROTHROMBIN TIME: CPT | Performed by: NURSE PRACTITIONER

## 2024-03-19 ENCOUNTER — APPOINTMENT (OUTPATIENT)
Dept: GENERAL RADIOLOGY | Facility: HOSPITAL | Age: 74
End: 2024-03-19
Payer: MEDICARE

## 2024-03-19 ENCOUNTER — HOSPITAL ENCOUNTER (OUTPATIENT)
Facility: HOSPITAL | Age: 74
Setting detail: OBSERVATION
Discharge: HOSPICE | End: 2024-03-22
Attending: EMERGENCY MEDICINE | Admitting: INTERNAL MEDICINE
Payer: MEDICARE

## 2024-03-19 DIAGNOSIS — Z51.5 HOSPICE CARE PATIENT: ICD-10-CM

## 2024-03-19 DIAGNOSIS — C79.51 MALIGNANT NEOPLASM METASTATIC TO BONE: ICD-10-CM

## 2024-03-19 DIAGNOSIS — Z86.79 HISTORY OF CORONARY ARTERY DISEASE: ICD-10-CM

## 2024-03-19 DIAGNOSIS — I48.20 CHRONIC ATRIAL FIBRILLATION: ICD-10-CM

## 2024-03-19 DIAGNOSIS — R07.89 CHEST DISCOMFORT: Primary | ICD-10-CM

## 2024-03-19 PROBLEM — I50.9 CHF (CONGESTIVE HEART FAILURE): Status: ACTIVE | Noted: 2024-03-19

## 2024-03-19 LAB
ALBUMIN SERPL-MCNC: 3.1 G/DL (ref 3.5–5.2)
ALBUMIN/GLOB SERPL: 0.8 G/DL
ALP SERPL-CCNC: 108 U/L (ref 39–117)
ALT SERPL W P-5'-P-CCNC: 24 U/L (ref 1–41)
ANION GAP SERPL CALCULATED.3IONS-SCNC: 12.1 MMOL/L (ref 5–15)
AST SERPL-CCNC: 27 U/L (ref 1–40)
BASOPHILS # BLD AUTO: 0.08 10*3/MM3 (ref 0–0.2)
BASOPHILS NFR BLD AUTO: 0.8 % (ref 0–1.5)
BILIRUB SERPL-MCNC: 0.7 MG/DL (ref 0–1.2)
BUN SERPL-MCNC: 11 MG/DL (ref 8–23)
BUN/CREAT SERPL: 18 (ref 7–25)
CALCIUM SPEC-SCNC: 8.9 MG/DL (ref 8.6–10.5)
CHLORIDE SERPL-SCNC: 98 MMOL/L (ref 98–107)
CO2 SERPL-SCNC: 23.9 MMOL/L (ref 22–29)
CREAT SERPL-MCNC: 0.61 MG/DL (ref 0.76–1.27)
DEPRECATED RDW RBC AUTO: 47.8 FL (ref 37–54)
EGFRCR SERPLBLD CKD-EPI 2021: 99.5 ML/MIN/1.73
EOSINOPHIL # BLD AUTO: 0.11 10*3/MM3 (ref 0–0.4)
EOSINOPHIL NFR BLD AUTO: 1.1 % (ref 0.3–6.2)
ERYTHROCYTE [DISTWIDTH] IN BLOOD BY AUTOMATED COUNT: 14.3 % (ref 12.3–15.4)
GEN 5 2HR TROPONIN T REFLEX: 22 NG/L
GLOBULIN UR ELPH-MCNC: 3.7 GM/DL
GLUCOSE BLDC GLUCOMTR-MCNC: 78 MG/DL (ref 70–99)
GLUCOSE SERPL-MCNC: 108 MG/DL (ref 65–99)
HCT VFR BLD AUTO: 35.2 % (ref 37.5–51)
HGB BLD-MCNC: 11.6 G/DL (ref 13–17.7)
HOLD SPECIMEN: NORMAL
HOLD SPECIMEN: NORMAL
IMM GRANULOCYTES # BLD AUTO: 0.04 10*3/MM3 (ref 0–0.05)
IMM GRANULOCYTES NFR BLD AUTO: 0.4 % (ref 0–0.5)
INR PPP: 1.31 (ref 0.86–1.15)
LIPASE SERPL-CCNC: 13 U/L (ref 13–60)
LYMPHOCYTES # BLD AUTO: 1.12 10*3/MM3 (ref 0.7–3.1)
LYMPHOCYTES NFR BLD AUTO: 11.1 % (ref 19.6–45.3)
MAGNESIUM SERPL-MCNC: 1.6 MG/DL (ref 1.6–2.4)
MCH RBC QN AUTO: 30.4 PG (ref 26.6–33)
MCHC RBC AUTO-ENTMCNC: 33 G/DL (ref 31.5–35.7)
MCV RBC AUTO: 92.1 FL (ref 79–97)
MONOCYTES # BLD AUTO: 0.97 10*3/MM3 (ref 0.1–0.9)
MONOCYTES NFR BLD AUTO: 9.6 % (ref 5–12)
NEUTROPHILS NFR BLD AUTO: 7.8 10*3/MM3 (ref 1.7–7)
NEUTROPHILS NFR BLD AUTO: 77 % (ref 42.7–76)
NRBC BLD AUTO-RTO: 0 /100 WBC (ref 0–0.2)
NT-PROBNP SERPL-MCNC: 1943 PG/ML (ref 0–1800)
PLATELET # BLD AUTO: 226 10*3/MM3 (ref 140–450)
PMV BLD AUTO: 8.9 FL (ref 6–12)
POTASSIUM SERPL-SCNC: 3.3 MMOL/L (ref 3.5–5.2)
PROT SERPL-MCNC: 6.8 G/DL (ref 6–8.5)
PROTHROMBIN TIME: 16.5 SECONDS (ref 11.8–14.9)
QT INTERVAL: 354 MS
QTC INTERVAL: 460 MS
RBC # BLD AUTO: 3.82 10*6/MM3 (ref 4.14–5.8)
SODIUM SERPL-SCNC: 134 MMOL/L (ref 136–145)
TROPONIN T DELTA: -1 NG/L
TROPONIN T SERPL HS-MCNC: 23 NG/L
TROPONIN T SERPL HS-MCNC: 23 NG/L
WBC NRBC COR # BLD AUTO: 10.12 10*3/MM3 (ref 3.4–10.8)
WHOLE BLOOD HOLD COAG: NORMAL
WHOLE BLOOD HOLD SPECIMEN: NORMAL

## 2024-03-19 PROCEDURE — 83880 ASSAY OF NATRIURETIC PEPTIDE: CPT | Performed by: EMERGENCY MEDICINE

## 2024-03-19 PROCEDURE — G0378 HOSPITAL OBSERVATION PER HR: HCPCS

## 2024-03-19 PROCEDURE — 80053 COMPREHEN METABOLIC PANEL: CPT | Performed by: EMERGENCY MEDICINE

## 2024-03-19 PROCEDURE — 36415 COLL VENOUS BLD VENIPUNCTURE: CPT

## 2024-03-19 PROCEDURE — 85025 COMPLETE CBC W/AUTO DIFF WBC: CPT | Performed by: EMERGENCY MEDICINE

## 2024-03-19 PROCEDURE — 85610 PROTHROMBIN TIME: CPT | Performed by: INTERNAL MEDICINE

## 2024-03-19 PROCEDURE — 96374 THER/PROPH/DIAG INJ IV PUSH: CPT

## 2024-03-19 PROCEDURE — 93005 ELECTROCARDIOGRAM TRACING: CPT | Performed by: EMERGENCY MEDICINE

## 2024-03-19 PROCEDURE — 96376 TX/PRO/DX INJ SAME DRUG ADON: CPT

## 2024-03-19 PROCEDURE — 84484 ASSAY OF TROPONIN QUANT: CPT | Performed by: EMERGENCY MEDICINE

## 2024-03-19 PROCEDURE — 84484 ASSAY OF TROPONIN QUANT: CPT

## 2024-03-19 PROCEDURE — 25010000002 MORPHINE PER 10 MG: Performed by: INTERNAL MEDICINE

## 2024-03-19 PROCEDURE — 93005 ELECTROCARDIOGRAM TRACING: CPT

## 2024-03-19 PROCEDURE — 83690 ASSAY OF LIPASE: CPT | Performed by: EMERGENCY MEDICINE

## 2024-03-19 PROCEDURE — 99223 1ST HOSP IP/OBS HIGH 75: CPT | Performed by: INTERNAL MEDICINE

## 2024-03-19 PROCEDURE — 82948 REAGENT STRIP/BLOOD GLUCOSE: CPT

## 2024-03-19 PROCEDURE — 71045 X-RAY EXAM CHEST 1 VIEW: CPT

## 2024-03-19 PROCEDURE — 99285 EMERGENCY DEPT VISIT HI MDM: CPT

## 2024-03-19 PROCEDURE — 83735 ASSAY OF MAGNESIUM: CPT | Performed by: EMERGENCY MEDICINE

## 2024-03-19 RX ORDER — METOPROLOL SUCCINATE 25 MG/1
1 TABLET, EXTENDED RELEASE ORAL DAILY
COMMUNITY
Start: 2024-02-20

## 2024-03-19 RX ORDER — IBUPROFEN 600 MG/1
1 TABLET ORAL
Status: DISCONTINUED | OUTPATIENT
Start: 2024-03-19 | End: 2024-03-22 | Stop reason: HOSPADM

## 2024-03-19 RX ORDER — ACETAMINOPHEN 160 MG/5ML
650 SOLUTION ORAL EVERY 4 HOURS PRN
Status: DISCONTINUED | OUTPATIENT
Start: 2024-03-19 | End: 2024-03-22 | Stop reason: HOSPADM

## 2024-03-19 RX ORDER — MORPHINE SULFATE 2 MG/ML
2 INJECTION, SOLUTION INTRAMUSCULAR; INTRAVENOUS EVERY 4 HOURS PRN
Status: DISCONTINUED | OUTPATIENT
Start: 2024-03-19 | End: 2024-03-20

## 2024-03-19 RX ORDER — SODIUM CHLORIDE 9 MG/ML
40 INJECTION, SOLUTION INTRAVENOUS AS NEEDED
Status: DISCONTINUED | OUTPATIENT
Start: 2024-03-19 | End: 2024-03-22 | Stop reason: HOSPADM

## 2024-03-19 RX ORDER — ASPIRIN 81 MG/1
324 TABLET, CHEWABLE ORAL ONCE
Status: DISCONTINUED | OUTPATIENT
Start: 2024-03-19 | End: 2024-03-20

## 2024-03-19 RX ORDER — MAGNESIUM OXIDE 400 MG/1
400 TABLET ORAL NIGHTLY
COMMUNITY
End: 2024-03-22 | Stop reason: HOSPADM

## 2024-03-19 RX ORDER — TRAMADOL HYDROCHLORIDE 50 MG/1
50 TABLET ORAL EVERY 6 HOURS PRN
Status: DISCONTINUED | OUTPATIENT
Start: 2024-03-19 | End: 2024-03-22 | Stop reason: HOSPADM

## 2024-03-19 RX ORDER — LOSARTAN POTASSIUM 50 MG/1
1 TABLET ORAL DAILY
COMMUNITY
Start: 2024-02-20 | End: 2024-03-22 | Stop reason: HOSPADM

## 2024-03-19 RX ORDER — BISACODYL 10 MG
10 SUPPOSITORY, RECTAL RECTAL DAILY PRN
Status: DISCONTINUED | OUTPATIENT
Start: 2024-03-19 | End: 2024-03-22 | Stop reason: HOSPADM

## 2024-03-19 RX ORDER — MELOXICAM 15 MG/1
1 TABLET ORAL DAILY
COMMUNITY
Start: 2024-02-20 | End: 2024-03-22 | Stop reason: HOSPADM

## 2024-03-19 RX ORDER — ONDANSETRON 2 MG/ML
4 INJECTION INTRAMUSCULAR; INTRAVENOUS EVERY 6 HOURS PRN
Status: DISCONTINUED | OUTPATIENT
Start: 2024-03-19 | End: 2024-03-21

## 2024-03-19 RX ORDER — METHOCARBAMOL 500 MG/1
500 TABLET, FILM COATED ORAL 4 TIMES DAILY
COMMUNITY
End: 2024-03-19

## 2024-03-19 RX ORDER — NITROGLYCERIN 0.4 MG/1
0.4 TABLET SUBLINGUAL
Status: DISCONTINUED | OUTPATIENT
Start: 2024-03-19 | End: 2024-03-21

## 2024-03-19 RX ORDER — AMITRIPTYLINE HYDROCHLORIDE 75 MG/1
1 TABLET ORAL DAILY
COMMUNITY
Start: 2024-02-20 | End: 2024-03-22 | Stop reason: HOSPADM

## 2024-03-19 RX ORDER — POLYETHYLENE GLYCOL 3350 17 G/17G
17 POWDER, FOR SOLUTION ORAL DAILY PRN
Status: DISCONTINUED | OUTPATIENT
Start: 2024-03-19 | End: 2024-03-22 | Stop reason: HOSPADM

## 2024-03-19 RX ORDER — AMITRIPTYLINE HYDROCHLORIDE 10 MG/1
10 TABLET, FILM COATED ORAL DAILY
COMMUNITY
End: 2024-03-19

## 2024-03-19 RX ORDER — SODIUM CHLORIDE 0.9 % (FLUSH) 0.9 %
10 SYRINGE (ML) INJECTION AS NEEDED
Status: DISCONTINUED | OUTPATIENT
Start: 2024-03-19 | End: 2024-03-22 | Stop reason: HOSPADM

## 2024-03-19 RX ORDER — OXYCODONE HYDROCHLORIDE 5 MG/1
5 TABLET ORAL EVERY 6 HOURS PRN
COMMUNITY
Start: 2024-03-16 | End: 2024-03-22 | Stop reason: HOSPADM

## 2024-03-19 RX ORDER — AMOXICILLIN 250 MG
1 CAPSULE ORAL 2 TIMES DAILY
COMMUNITY

## 2024-03-19 RX ORDER — IPRATROPIUM BROMIDE AND ALBUTEROL SULFATE 2.5; .5 MG/3ML; MG/3ML
3 SOLUTION RESPIRATORY (INHALATION)
Status: DISCONTINUED | OUTPATIENT
Start: 2024-03-19 | End: 2024-03-21

## 2024-03-19 RX ORDER — DILTIAZEM HYDROCHLORIDE 240 MG/1
1 CAPSULE, COATED, EXTENDED RELEASE ORAL DAILY
COMMUNITY
Start: 2024-02-20

## 2024-03-19 RX ORDER — ACETAMINOPHEN 325 MG/1
650 TABLET ORAL EVERY 4 HOURS PRN
Status: DISCONTINUED | OUTPATIENT
Start: 2024-03-19 | End: 2024-03-22 | Stop reason: HOSPADM

## 2024-03-19 RX ORDER — WARFARIN SODIUM 2.5 MG/1
2.5 TABLET ORAL
Status: DISCONTINUED | OUTPATIENT
Start: 2024-03-19 | End: 2024-03-19

## 2024-03-19 RX ORDER — BISACODYL 5 MG/1
5 TABLET, DELAYED RELEASE ORAL DAILY PRN
Status: DISCONTINUED | OUTPATIENT
Start: 2024-03-19 | End: 2024-03-22 | Stop reason: HOSPADM

## 2024-03-19 RX ORDER — WARFARIN SODIUM 1 MG/1
1 TABLET ORAL
COMMUNITY
Start: 2024-02-20 | End: 2024-03-22 | Stop reason: HOSPADM

## 2024-03-19 RX ORDER — AMOXICILLIN 250 MG
2 CAPSULE ORAL 2 TIMES DAILY PRN
Status: DISCONTINUED | OUTPATIENT
Start: 2024-03-19 | End: 2024-03-22 | Stop reason: HOSPADM

## 2024-03-19 RX ORDER — DEXTROSE MONOHYDRATE 25 G/50ML
25 INJECTION, SOLUTION INTRAVENOUS
Status: DISCONTINUED | OUTPATIENT
Start: 2024-03-19 | End: 2024-03-22 | Stop reason: HOSPADM

## 2024-03-19 RX ORDER — ATORVASTATIN CALCIUM 80 MG/1
1 TABLET, FILM COATED ORAL NIGHTLY
COMMUNITY
Start: 2024-02-14 | End: 2024-03-22 | Stop reason: HOSPADM

## 2024-03-19 RX ORDER — ASPIRIN 81 MG/1
81 TABLET ORAL DAILY
COMMUNITY
End: 2024-03-22 | Stop reason: HOSPADM

## 2024-03-19 RX ORDER — OXYCODONE HYDROCHLORIDE 5 MG/1
5 CAPSULE ORAL EVERY 6 HOURS PRN
COMMUNITY
End: 2024-03-19

## 2024-03-19 RX ORDER — ACETAMINOPHEN 650 MG/1
650 SUPPOSITORY RECTAL EVERY 4 HOURS PRN
Status: DISCONTINUED | OUTPATIENT
Start: 2024-03-19 | End: 2024-03-22 | Stop reason: HOSPADM

## 2024-03-19 RX ORDER — AMOXICILLIN 250 MG
1 CAPSULE ORAL DAILY
COMMUNITY
End: 2024-03-19

## 2024-03-19 RX ORDER — INSULIN LISPRO 100 [IU]/ML
2-7 INJECTION, SOLUTION INTRAVENOUS; SUBCUTANEOUS
Status: DISCONTINUED | OUTPATIENT
Start: 2024-03-19 | End: 2024-03-22 | Stop reason: HOSPADM

## 2024-03-19 RX ORDER — WARFARIN SODIUM 2.5 MG/1
2.5 TABLET ORAL
Status: COMPLETED | OUTPATIENT
Start: 2024-03-19 | End: 2024-03-19

## 2024-03-19 RX ORDER — SODIUM CHLORIDE 0.9 % (FLUSH) 0.9 %
10 SYRINGE (ML) INJECTION EVERY 12 HOURS SCHEDULED
Status: DISCONTINUED | OUTPATIENT
Start: 2024-03-19 | End: 2024-03-22 | Stop reason: HOSPADM

## 2024-03-19 RX ORDER — NICOTINE POLACRILEX 4 MG
15 LOZENGE BUCCAL
Status: DISCONTINUED | OUTPATIENT
Start: 2024-03-19 | End: 2024-03-22 | Stop reason: HOSPADM

## 2024-03-19 RX ADMIN — MORPHINE SULFATE 2 MG: 2 INJECTION, SOLUTION INTRAMUSCULAR; INTRAVENOUS at 21:33

## 2024-03-19 RX ADMIN — MORPHINE SULFATE 2 MG: 2 INJECTION, SOLUTION INTRAMUSCULAR; INTRAVENOUS at 17:08

## 2024-03-19 RX ADMIN — Medication 10 ML: at 21:33

## 2024-03-19 RX ADMIN — WARFARIN SODIUM 2.5 MG: 2.5 TABLET ORAL at 21:33

## 2024-03-19 NOTE — H&P
[facility]  Date: 3/19/2024   Patient Name: Jeet Ashley  : 1950  MRN: [id number]  Primary Care Physician:  Isaias, No Known  Date of admission: 3/19/2024    Subjective   Subjective     Chief Complaint: Chest pain    HPI:    Jeet Ashley is a 74 y.o. male past medical history of CAD, COPD, recent diagnosis of suspected metastatic disease unknown primary that presents to the emergency department for evaluation of chest pain that began earlier this morning.  He describes it as pressure, radiates towards the back and neck, constant, no known aggravating alleviating factors.  He denies any fevers, chills, sweats, nausea, vomiting, shortness of breath, palpitations, abdominal pain diarrhea constipation dysuria, weakness, rash.  Patient with minimal troponin elevation in the emergency department and will be admitted for ongoing monitoring and management.    After long discussion with patient and family, it seems they have been discussing the possibility of hospice already.  They request to speak with palliative care team prior to deciding on further aggressive management including restratification given chest pain and any potential treatments for suspected metastatic disease.      Personal History     Past Medical History:  Past Medical History:   Diagnosis Date    Alcoholism     CAD, multiple vessel     COPD (chronic obstructive pulmonary disease)          Past Surgical History:  Past Surgical History:   Procedure Laterality Date    ABDOMINAL AORTIC ANEURYSM REPAIR, AORTIC BIFEMORAL ILIAC RENAL BYPASS      BACK SURGERY      x4    CARDIAC PACEMAKER PLACEMENT      CARDIAC SURGERY      Triple Bypass    CHOLECYSTECTOMY      CORONARY ARTERY BYPASS GRAFT      3V    PROSTATE SURGERY           Family History:   Family History   Problem Relation Age of Onset    No Known Problems Mother     No Known Problems Father          Social History:   Social History     Tobacco Use     Smoking status: Every Day     Current packs/day: 1.50     Average packs/day: 1.5 packs/day for 50.0 years (75.0 ttl pk-yrs)     Types: Cigarettes    Smokeless tobacco: Never   Vaping Use    Vaping status: Never Used   Substance Use Topics    Alcohol use: Yes     Comment: 12 beers daily x 30 yrs    Drug use: Never         Home Medications:  albuterol sulfate HFA, amitriptyline, aspirin, atorvastatin, dilTIAZem CD, losartan, magnesium oxide, meloxicam, metFORMIN, metoprolol succinate XL, oxyCODONE, sennosides-docusate, and warfarin    Allergies:  Allergies   Allergen Reactions    Toradol [Ketorolac Tromethamine] GI Intolerance       Review of Systems   All systems were reviewed and negative except for: Chest pain, back pain, neck pain    Objective   Objective     Vitals:   Temp:  [98.9 °F (37.2 °C)] 98.9 °F (37.2 °C)  Heart Rate:  [95] 95  Resp:  [20] 20  BP: (120)/(55) 120/55  Flow (L/min):  [3] 3    Physical Exam    Constitutional: Awake, alert, no acute distress   Eyes: Pupils equal, sclerae anicteric, no conjunctival injection   HENT: NCAT, mucous membranes moist   Neck: Supple, no thyromegaly, no lymphadenopathy, trachea midline   Respiratory: Clear to auscultation bilaterally, nonlabored respirations    Cardiovascular: RRR, no murmurs, rubs, or gallops, palpable pedal pulses bilaterally   Gastrointestinal: Positive bowel sounds, soft, nontender, nondistended   Musculoskeletal: No bilateral ankle edema, no clubbing or cyanosis to extremities   Psychiatric: Appropriate affect, cooperative   Neurologic: Oriented x 3, strength symmetric in all extremities, Cranial Nerves grossly intact to confrontation, speech clear   Skin: No rashes     Result Review    Result Review:  I have personally reviewed the results from the time of this admission to 3/19/2024 16:59 EDT and agree with these findings:  [x]  Laboratory  []  Microbiology  [x]  Radiology  []  EKG/Telemetry   []  Cardiology/Vascular   []  Pathology  []  Old  records  []  Other:      Assessment & Plan   Assessment / Plan     Assessment/Plan:   Chest pain: Will admit and continue to monitor.  Trend troponin.  Monitor on telemetry.  Supportive care and pain control.  Family would like to discuss with palliative care before deciding on further workup assuming patient does not have non-STEMI.  Metastatic disease: Unknown primary: Recent diagnosis.  Patient and family interested in discussing with palliative care before deciding on whether or not to pursue any further treatment.  Supportive care and pain control.  History of COPD: No acute exacerbation: Chronic hypoxic respiratory failure 2 L at baseline.  Will resume home regimen.  Paroxysmal atrial fibrillation: Continue Coumadin.  Daily INR.  Telemetry  Diabetes mellitus type 2: Insulin sliding scale, monitor Ronson just needed      DVT prophylaxis:  Medical DVT prophylaxis orders are signed and held. Medical DVT prophylaxis orders are present.        CODE STATUS:    Code Status (Patient has no pulse and is not breathing): CPR (Attempt to Resuscitate)  Medical Interventions (Patient has pulse or is breathing): Full Support      Admission Status:  I believe this patient meets observation status.    Electronically signed by Soy Ga Jr, MD, 03/19/24, 4:59 PM EDT.

## 2024-03-19 NOTE — ED PROVIDER NOTES
Time: 2:49 PM EDT  Date of encounter:  3/19/2024  Independent Historian/Clinical History and Information was obtained by:   Patient  Chief Complaint: Chest discomfort    History is limited by: N/A    History of Present Illness:  Patient is a 74 y.o. year old male who presents to the emergency department for evaluation of chest discomfort.  Patient notes that he has had chest discomfort since early this morning.  He localizes it to the middle of the chest.  He states in the last hour it has radiated to the shoulders and down his back.  He also had some radiation to the left arm.  He had nausea but no vomiting.  The patient had no diaphoresis or unusual fatigue.  Patient does note shortness of breath but this is chronic and unchanged.  The patient does note he has a history of coronary disease.  The patient states that he has had bypass and stents.  The patient has not seen a cardiologist in a year and a half as he has moved from [facility].  The patient states he does take his medicine as prescribed.  The patient has no history of DVT or pulmonary embolism.  The patient does not have cancer.  Patient does have a history of COPD.  The patient states he quit smoking several weeks ago.    HPI    Patient Care Team  Primary Care Provider: Provider, No Known    Past Medical History:     Allergies   Allergen Reactions    Toradol [Ketorolac Tromethamine] GI Intolerance     Past Medical History:   Diagnosis Date    Alcoholism     CAD, multiple vessel     CHF (congestive heart failure)     COPD (chronic obstructive pulmonary disease)     Diabetes mellitus      Past Surgical History:   Procedure Laterality Date    ABDOMINAL AORTIC ANEURYSM REPAIR, AORTIC BIFEMORAL ILIAC RENAL BYPASS      BACK SURGERY      x4    CARDIAC PACEMAKER PLACEMENT      CARDIAC SURGERY      Triple Bypass    CHOLECYSTECTOMY      CORONARY ARTERY BYPASS GRAFT      3V    PROSTATE SURGERY       Family History   Problem Relation Age of Onset    No Known  Problems Mother     No Known Problems Father        Home Medications:  Prior to Admission medications    Medication Sig Start Date End Date Taking? Authorizing Provider   apixaban (ELIQUIS) 5 MG tablet tablet Take 1 tablet by mouth. 12/30/23 12/29/24 Yes Onofre Esparza MD   albuterol sulfate  (90 Base) MCG/ACT inhaler Inhale 2 puffs Every 6 (Six) Hours As Needed for Wheezing or Shortness of Air. Indications: Chronic Obstructive Lung Disease 5/24/22   Cam Coronado MD   amitriptyline (ELAVIL) 10 MG tablet Take 1 tablet by mouth Daily.    ProviderOnofre MD   aspirin 81 MG EC tablet Take 1 tablet by mouth Daily.    Onofre Esparza MD   atorvastatin (LIPITOR) 40 MG tablet Take 1 tablet by mouth Every Night. Indications: High Amount of Fats in the Blood 5/24/22   Cam Coronado MD   budesonide-formoterol (SYMBICORT) 160-4.5 MCG/ACT inhaler Inhale 2 puffs by mouth 2 (Two) Times a Day. Indications: Chronic Obstructive Lung Disease 5/24/22   Cam Coronado MD   methocarbamol (ROBAXIN) 500 MG tablet Take 1 tablet by mouth 4 (Four) Times a Day.    Onofre Esparza MD   metoprolol tartrate (LOPRESSOR) 25 MG tablet Take 1 tablet by mouth Every 12 (Twelve) Hours. 5/24/22   Cam Coronado MD   oxyCODONE (OXY-IR) 5 MG capsule Take 1 capsule by mouth Every 6 (Six) Hours As Needed for Moderate Pain.    Onofre Esparza MD   pantoprazole (PROTONIX) 40 MG EC tablet Take 1 tablet by mouth Every Morning. 5/25/22   Cam Coronado MD   sennosides-docusate (senna-docusate sodium) 8.6-50 MG per tablet Take 1 tablet by mouth Daily.    Onofre Esparza MD   clopidogrel (PLAVIX) 75 MG tablet Take 1 tablet by mouth Daily. 5/25/22 3/19/24  Cam Coronado MD        Social History:   Social History     Tobacco Use    Smoking status: Every Day     Current packs/day: 1.50     Average packs/day: 1.5 packs/day for 50.0 years (75.0 ttl pk-yrs)     Types: Cigarettes    Smokeless tobacco: Never   Vaping Use     "Vaping status: Never Used   Substance Use Topics    Alcohol use: Yes     Comment: 12 beers daily x 30 yrs    Drug use: Never         Review of Systems:  Review of Systems   Constitutional:  Negative for chills, diaphoresis and fever.   HENT:  Negative for congestion, postnasal drip, rhinorrhea and sore throat.    Eyes:  Negative for photophobia.   Respiratory:  Positive for shortness of breath. Negative for cough and chest tightness.    Cardiovascular:  Positive for chest pain. Negative for palpitations and leg swelling.   Gastrointestinal:  Negative for abdominal pain, diarrhea, nausea and vomiting.   Genitourinary:  Negative for difficulty urinating, dysuria, flank pain, frequency, hematuria and urgency.   Musculoskeletal:  Positive for back pain. Negative for arthralgias, neck pain and neck stiffness.   Skin:  Negative for pallor and rash.   Neurological:  Negative for dizziness, syncope, weakness, numbness and headaches.   Hematological:  Negative for adenopathy. Does not bruise/bleed easily.   Psychiatric/Behavioral: Negative.          Physical Exam:  /58 (BP Location: Right arm, Patient Position: Lying)   Pulse 77   Temp 97.9 °F (36.6 °C) (Oral)   Resp 10   Ht 177.8 cm (70\")   Wt 68 kg (149 lb 14.6 oz)   SpO2 97%   BMI 21.51 kg/m²     Physical Exam  Vitals and nursing note reviewed.   Constitutional:       General: He is not in acute distress.     Appearance: Normal appearance. He is not ill-appearing, toxic-appearing or diaphoretic.   HENT:      Head: Normocephalic and atraumatic.      Mouth/Throat:      Mouth: Mucous membranes are moist.   Eyes:      Pupils: Pupils are equal, round, and reactive to light.   Cardiovascular:      Rate and Rhythm: Normal rate and regular rhythm.      Pulses: Normal pulses.           Carotid pulses are 2+ on the right side and 2+ on the left side.       Radial pulses are 2+ on the right side and 2+ on the left side.        Femoral pulses are 2+ on the right side and " 2+ on the left side.       Popliteal pulses are 2+ on the right side and 2+ on the left side.        Dorsalis pedis pulses are 2+ on the right side and 2+ on the left side.        Posterior tibial pulses are 2+ on the right side and 2+ on the left side.      Heart sounds: Normal heart sounds. No murmur heard.  Pulmonary:      Effort: Pulmonary effort is normal. No accessory muscle usage, respiratory distress or retractions.      Breath sounds: Examination of the right-upper field reveals decreased breath sounds and wheezing. Examination of the left-upper field reveals decreased breath sounds and wheezing. Examination of the right-middle field reveals decreased breath sounds. Examination of the left-middle field reveals decreased breath sounds. Examination of the right-lower field reveals decreased breath sounds. Examination of the left-lower field reveals decreased breath sounds. Decreased breath sounds and wheezing present. No rhonchi or rales.   Chest:      Chest wall: No mass or tenderness.   Abdominal:      General: Abdomen is flat. There is no distension.      Palpations: Abdomen is soft. There is no mass or pulsatile mass.      Tenderness: There is no abdominal tenderness. There is no right CVA tenderness, left CVA tenderness, guarding or rebound.      Comments: No rigidity   Musculoskeletal:         General: No swelling, tenderness or deformity.      Cervical back: Neck supple. No tenderness.      Right lower leg: No tenderness. No edema.      Left lower leg: No tenderness. No edema.   Skin:     General: Skin is warm and dry.      Capillary Refill: Capillary refill takes less than 2 seconds.      Coloration: Skin is not jaundiced or pale.      Findings: No erythema.   Neurological:      General: No focal deficit present.      Mental Status: He is alert and oriented to person, place, and time. Mental status is at baseline.      Cranial Nerves: Cranial nerves 2-12 are intact. No cranial nerve deficit.       Sensory: Sensation is intact. No sensory deficit.      Motor: Motor function is intact. No weakness or pronator drift.      Coordination: Coordination is intact. Coordination normal.   Psychiatric:         Mood and Affect: Mood normal.         Behavior: Behavior normal.                  Procedures:  Procedures      Medical Decision Making:      Comorbidities that affect care:    COPD, coronary disease, hypertension, hypercholesteremia, atrial fibrillation, abdominal aortic aneurysm    External Notes reviewed:    None      The following orders were placed and all results were independently analyzed by me:  Orders Placed This Encounter   Procedures    XR Chest 1 View    [place] Draw    High Sensitivity Troponin T    Comprehensive Metabolic Panel    Lipase    BNP    Magnesium    CBC Auto Differential    High Sensitivity Troponin T 2Hr    CBC Auto Differential    Comprehensive Metabolic Panel    Protime-INR    Protime-INR    High Sensitivity Troponin T    Diet: Cardiac, Diabetic; Healthy Heart (2-3 Na+); Consistent Carbohydrate; Fluid Consistency: Thin (IDDSI 0)    Undress & Gown    Vital Signs    Intake & Output    Weigh Patient    Oral Care    Maintain IV Access    Telemetry - Place Orders & Notify Provider of Results When Patient Experiences Acute Chest Pain, Dysrhythmia or Respiratory Distress    Notify Provider (With Default Parameters)    Code Status and Medical Interventions:    Inpatient Hospitalist Consult    Inpatient Palliative Care Team Consult    Inpatient Nutrition Consult    Oxygen Therapy- Nasal Cannula; Titrate 1-6 LPM Per SpO2; 90 - 95%    POC Glucose 4x Daily Before Meals & at Bedtime    POC Glucose Once    POC Glucose Once    POC Glucose Once    POC Glucose Once    ECG 12 Lead ED Triage Standing Order; Chest Pain    ECG 12 Lead ED Triage Standing Order; Chest Pain    ECG 12 Lead Chest Pain    Insert Peripheral IV    Insert Peripheral IV    Initiate Observation Status    CBC & Differential    Green  Top (Gel)    Lavender Top    Gold Top - SST    Light Blue Top       Medications Given in the Emergency Department:  Medications   sodium chloride 0.9 % flush 10 mL (has no administration in time range)   sodium chloride 0.9 % flush 10 mL (10 mL Intravenous Given 3/20/24 2010)   sodium chloride 0.9 % flush 10 mL (has no administration in time range)   sodium chloride 0.9 % infusion 40 mL (has no administration in time range)   nitroglycerin (NITROSTAT) SL tablet 0.4 mg (has no administration in time range)   acetaminophen (TYLENOL) tablet 650 mg (has no administration in time range)     Or   acetaminophen (TYLENOL) 160 MG/5ML oral solution 650 mg (has no administration in time range)     Or   acetaminophen (TYLENOL) suppository 650 mg (has no administration in time range)   sennosides-docusate (PERICOLACE) 8.6-50 MG per tablet 2 tablet (has no administration in time range)     And   polyethylene glycol (MIRALAX) packet 17 g (has no administration in time range)     And   bisacodyl (DULCOLAX) EC tablet 5 mg (has no administration in time range)     And   bisacodyl (DULCOLAX) suppository 10 mg (has no administration in time range)   ondansetron (ZOFRAN) injection 4 mg (has no administration in time range)   dextrose (GLUTOSE) oral gel 15 g (has no administration in time range)   dextrose (D50W) (25 g/50 mL) IV injection 25 g (has no administration in time range)   glucagon (GLUCAGEN) injection 1 mg (has no administration in time range)   Insulin Lispro (humaLOG) injection 2-7 Units ( Subcutaneous Not Given 3/20/24 2005)   ipratropium-albuterol (DUO-NEB) nebulizer solution 3 mL (3 mL Nebulization Given 3/21/24 0038)   traMADol (ULTRAM) tablet 50 mg (has no administration in time range)   dilTIAZem CD (CARDIZEM CD) 24 hr capsule 240 mg (240 mg Oral Given 3/20/24 0827)   metoprolol succinate XL (TOPROL-XL) 24 hr tablet 25 mg (25 mg Oral Given 3/20/24 0827)   Influenza Vac High-Dose Quad (FLUZONE HIGH DOSE) injection 0.7  mL (has no administration in time range)   atorvastatin (LIPITOR) tablet 80 mg (80 mg Oral Given 3/20/24 2009)   apixaban (ELIQUIS) tablet 5 mg (5 mg Oral Given 3/20/24 2009)   oxyCODONE (ROXICODONE) immediate release tablet 5 mg (5 mg Oral Given 3/20/24 2227)   warfarin (COUMADIN) tablet 2.5 mg (2.5 mg Oral Given 3/19/24 2133)   metoprolol tartrate (LOPRESSOR) injection 2.5 mg (2.5 mg Intravenous Given 3/20/24 0244)   oxyCODONE (ROXICODONE) immediate release tablet 5 mg (5 mg Oral Given 3/20/24 1544)        ED Course:    ED Course as of 03/21/24 0051   Tue Mar 19, 2024   1419 EKG:    Rhythm: Atrial fibrillation  Rate: 96  Intervals: Normal QT interval  T-wave: Nonspecific T wave flattening  ST Segment: There is no obvious pathological ST elevation and reciprocal ST depression to suggest STEMI    EKG Comparison: EKG is changed from EKG performed May 19, 2022 is that EKG has a normal sinus rhythm.  There is no significant ST changes    Interpreted by me   [SD]      ED Course User Index  [SD] Jey Moeller DO       Labs:    Lab Results (last 24 hours)       Procedure Component Value Units Date/Time    CBC Auto Differential [[id number]]  (Abnormal) Collected: 03/20/24 0407    Specimen: Blood from Arm, Right Updated: 03/20/24 0458     WBC 10.47 10*3/mm3      RBC 3.90 10*6/mm3      Hemoglobin 11.7 g/dL      Hematocrit 35.7 %      MCV 91.5 fL      MCH 30.0 pg      MCHC 32.8 g/dL      RDW 14.5 %      RDW-SD 48.9 fl      MPV 9.2 fL      Platelets 229 10*3/mm3      Neutrophil % 81.1 %      Lymphocyte % 6.9 %      Monocyte % 9.2 %      Eosinophil % 1.7 %      Basophil % 0.7 %      Immature Grans % 0.4 %      Neutrophils, Absolute 8.50 10*3/mm3      Lymphocytes, Absolute 0.72 10*3/mm3      Monocytes, Absolute 0.96 10*3/mm3      Eosinophils, Absolute 0.18 10*3/mm3      Basophils, Absolute 0.07 10*3/mm3      Immature Grans, Absolute 0.04 10*3/mm3      nRBC 0.0 /100 WBC     Comprehensive Metabolic Panel [[id number]]  (Abnormal)  Collected: 03/20/24 0407    Specimen: Blood from Arm, Right Updated: 03/20/24 0521     Glucose 103 mg/dL      BUN 10 mg/dL      Creatinine 0.60 mg/dL      Sodium 135 mmol/L      Potassium 3.6 mmol/L      Chloride 100 mmol/L      CO2 24.1 mmol/L      Calcium 8.9 mg/dL      Total Protein 6.5 g/dL      Albumin 3.0 g/dL      ALT (SGPT) 24 U/L      AST (SGOT) 26 U/L      Alkaline Phosphatase 103 U/L      Total Bilirubin 0.7 mg/dL      Globulin 3.5 gm/dL      A/G Ratio 0.9 g/dL      BUN/Creatinine Ratio 16.7     Anion Gap 10.9 mmol/L      eGFR 100.0 mL/min/1.73     Narrative:      GFR Normal >60  Chronic Kidney Disease <60  Kidney Failure <15    The GFR formula is only valid for adults with stable renal function between ages 18 and 70.    Protime-INR [[id number]]  (Abnormal) Collected: 03/20/24 0407    Specimen: Blood from Arm, Right Updated: 03/20/24 0502     Protime 16.7 Seconds      INR 1.32    Narrative:      Suggested Therapeutic Ranges For Oral Anticoagulant Therapy:  Level of Therapy                      INR Target Range  Standard Dose                            2.0-3.0  High Dose                                2.5-3.5  Patients not receiving anticoagulant  Therapy Normal Range                     0.86-1.15    POC Glucose 4x Daily Before Meals & at Bedtime [[id number]]  (Abnormal) Collected: 03/20/24 0748    Specimen: Blood Updated: 03/20/24 0751     Glucose 104 mg/dL      Comment: Serial Number: [id number]:  [id number]       POC Glucose Once [[id number]]  (Abnormal) Collected: 03/20/24 1116    Specimen: Blood Updated: 03/20/24 1118     Glucose 165 mg/dL      Comment: Serial Number: [id number]:  [id number]       POC Glucose Once [[id number]]  (Abnormal) Collected: 03/20/24 1655    Specimen: Blood Updated: 03/20/24 1657     Glucose 120 mg/dL      Comment: Serial Number: [id number]:  [id number]       POC Glucose Once [[id number]]  (Normal) Collected: 03/20/24 1935    Specimen: Blood Updated: 03/20/24  1940     Glucose 94 mg/dL      Comment: Serial Number: [id number]:  [id number]                Imaging:    No Radiology Exams Resulted Within Past 24 Hours      Differential Diagnosis and Discussion:    Chest Pain:  Based on the patient's signs and symptoms, I considered aortic dissection, myocardial infaction, pulmonary embolism, cardiac tamponade, pericarditis, pneumothorax, musculoskeletal chest pain and other differential diagnosis as an etiology of the patient's chest pain.     All labs were reviewed and interpreted by me.  All X-rays impressions were independently interpreted by me.  EKG was interpreted by me.    MDM  Number of Diagnoses or Management Options  Chest discomfort  Chronic atrial fibrillation  History of coronary artery disease  Diagnosis management comments: The patient's CBC was reviewed and shows no abnormalities of critical concern.  Of note, there is no anemia requiring a blood transfusion and the platelet count is acceptable    The patient's CMP was reviewed and shows no abnormalities of critical concern.  Of note, the patient's sodium and potassium are acceptable.  The patient's liver enzymes are unremarkable.  The patient's renal function including creatinine is preserved.  The patient has a normal anion gap.    The patient's EKG demonstrated atrial fibrillation with rate controlled at 96.  There is no evidence of STEMI.    Patient's first troponin was mildly elevated at 23.  He was repeated 2 hours later and it was stable at 22.    HEART Score for Major Cardiac Events - MDCalc  Calculated on Mar 19 2024 4:16 PM  6 points -> Moderate Score (4-6 points) Risk of MACE of 12-16.6%.  If troponin is positive, many experts recommend further workup and admission even with a low HEART Score.    The patient has a heart score of 6.  The patient has a history of coronary disease.  The patient was subsequently admitted to the hospital for further evaluation of the chest discomfort with his known  moderate risk       Amount and/or Complexity of Data Reviewed  Clinical lab tests: reviewed  Tests in the radiology section of CPT®: reviewed  Tests in the medicine section of CPT®: reviewed  Review and summarize past medical records: yes (I reviewed the patient's CT scan of the abdomen pelvis with contrast performed March 14, 2024.  The impressions include a dominant right liver mass suspicious for metastatic disease.  There is also smaller hyperdense lesions consistent for other metastasis.  There is no other inflammatory changes.  There is no evidence of aortic aneurysm      I reviewed the CT scan of the chest performed on March 14, 2024.  There is no evidence of pulmonary embolism.  Patient had development of multiple pulmonary nodules which was suspicious for metastatic disease.  The patient also had significant progression of mediastinal and right hilar adenopathy.  There was a small to moderate right pleural effusion.  Of note the patient had a thoracic aorta which was normal in caliber without evidence of aneurysm or dissection  )  Discuss the patient with other providers: yes (16:28 EDT  I discussed the case with the hospitalist, Dr. Ga.  We have discussed the patient's presenting symptoms, laboratory values, imaging and condition at the time of admission.  They will evaluate the patient in the emergency room and admit the patient to the hospital)           Social Determinants of Health:    Patient is independent, reliable, and has access to care.       Disposition and Care Coordination:    Admit:   Through independent evaluation of the patient's history, physical, and imperical data, the patient meets criteria for inpatient admission to the hospital.        Final diagnoses:   Chest discomfort   Chronic atrial fibrillation   History of coronary artery disease        ED Disposition       ED Disposition   Decision to Admit    Condition   --    Comment   Level of Care: Telemetry [5]   Diagnosis: CHF  (congestive heart failure) [[id number]]                 This medical record created using voice recognition software.             Jey Moeller DO  03/21/24 0051

## 2024-03-19 NOTE — PLAN OF CARE
Goal Outcome Evaluation:  Plan of Care Reviewed With: patient                     PT IS NEW IN THE UNIT.

## 2024-03-20 LAB
ALBUMIN SERPL-MCNC: 3 G/DL (ref 3.5–5.2)
ALBUMIN/GLOB SERPL: 0.9 G/DL
ALP SERPL-CCNC: 103 U/L (ref 39–117)
ALT SERPL W P-5'-P-CCNC: 24 U/L (ref 1–41)
ANION GAP SERPL CALCULATED.3IONS-SCNC: 10.9 MMOL/L (ref 5–15)
AST SERPL-CCNC: 26 U/L (ref 1–40)
BASOPHILS # BLD AUTO: 0.07 10*3/MM3 (ref 0–0.2)
BASOPHILS NFR BLD AUTO: 0.7 % (ref 0–1.5)
BILIRUB SERPL-MCNC: 0.7 MG/DL (ref 0–1.2)
BUN SERPL-MCNC: 10 MG/DL (ref 8–23)
BUN/CREAT SERPL: 16.7 (ref 7–25)
CALCIUM SPEC-SCNC: 8.9 MG/DL (ref 8.6–10.5)
CHLORIDE SERPL-SCNC: 100 MMOL/L (ref 98–107)
CO2 SERPL-SCNC: 24.1 MMOL/L (ref 22–29)
CREAT SERPL-MCNC: 0.6 MG/DL (ref 0.76–1.27)
DEPRECATED RDW RBC AUTO: 48.9 FL (ref 37–54)
EGFRCR SERPLBLD CKD-EPI 2021: 100 ML/MIN/1.73
EOSINOPHIL # BLD AUTO: 0.18 10*3/MM3 (ref 0–0.4)
EOSINOPHIL NFR BLD AUTO: 1.7 % (ref 0.3–6.2)
ERYTHROCYTE [DISTWIDTH] IN BLOOD BY AUTOMATED COUNT: 14.5 % (ref 12.3–15.4)
GLOBULIN UR ELPH-MCNC: 3.5 GM/DL
GLUCOSE BLDC GLUCOMTR-MCNC: 104 MG/DL (ref 70–99)
GLUCOSE BLDC GLUCOMTR-MCNC: 120 MG/DL (ref 70–99)
GLUCOSE BLDC GLUCOMTR-MCNC: 165 MG/DL (ref 70–99)
GLUCOSE BLDC GLUCOMTR-MCNC: 94 MG/DL (ref 70–99)
GLUCOSE SERPL-MCNC: 103 MG/DL (ref 65–99)
HCT VFR BLD AUTO: 35.7 % (ref 37.5–51)
HGB BLD-MCNC: 11.7 G/DL (ref 13–17.7)
IMM GRANULOCYTES # BLD AUTO: 0.04 10*3/MM3 (ref 0–0.05)
IMM GRANULOCYTES NFR BLD AUTO: 0.4 % (ref 0–0.5)
INR PPP: 1.32 (ref 0.86–1.15)
LYMPHOCYTES # BLD AUTO: 0.72 10*3/MM3 (ref 0.7–3.1)
LYMPHOCYTES NFR BLD AUTO: 6.9 % (ref 19.6–45.3)
MCH RBC QN AUTO: 30 PG (ref 26.6–33)
MCHC RBC AUTO-ENTMCNC: 32.8 G/DL (ref 31.5–35.7)
MCV RBC AUTO: 91.5 FL (ref 79–97)
MONOCYTES # BLD AUTO: 0.96 10*3/MM3 (ref 0.1–0.9)
MONOCYTES NFR BLD AUTO: 9.2 % (ref 5–12)
NEUTROPHILS NFR BLD AUTO: 8.5 10*3/MM3 (ref 1.7–7)
NEUTROPHILS NFR BLD AUTO: 81.1 % (ref 42.7–76)
NRBC BLD AUTO-RTO: 0 /100 WBC (ref 0–0.2)
PLATELET # BLD AUTO: 229 10*3/MM3 (ref 140–450)
PMV BLD AUTO: 9.2 FL (ref 6–12)
POTASSIUM SERPL-SCNC: 3.6 MMOL/L (ref 3.5–5.2)
PROT SERPL-MCNC: 6.5 G/DL (ref 6–8.5)
PROTHROMBIN TIME: 16.7 SECONDS (ref 11.8–14.9)
QT INTERVAL: 368 MS
QT INTERVAL: 381 MS
QTC INTERVAL: 457 MS
QTC INTERVAL: 490 MS
RBC # BLD AUTO: 3.9 10*6/MM3 (ref 4.14–5.8)
SODIUM SERPL-SCNC: 135 MMOL/L (ref 136–145)
WBC NRBC COR # BLD AUTO: 10.47 10*3/MM3 (ref 3.4–10.8)

## 2024-03-20 PROCEDURE — 94799 UNLISTED PULMONARY SVC/PX: CPT

## 2024-03-20 PROCEDURE — 94761 N-INVAS EAR/PLS OXIMETRY MLT: CPT

## 2024-03-20 PROCEDURE — 94640 AIRWAY INHALATION TREATMENT: CPT

## 2024-03-20 PROCEDURE — 25010000002 MORPHINE PER 10 MG: Performed by: INTERNAL MEDICINE

## 2024-03-20 PROCEDURE — 80053 COMPREHEN METABOLIC PANEL: CPT | Performed by: INTERNAL MEDICINE

## 2024-03-20 PROCEDURE — 96375 TX/PRO/DX INJ NEW DRUG ADDON: CPT

## 2024-03-20 PROCEDURE — G0378 HOSPITAL OBSERVATION PER HR: HCPCS

## 2024-03-20 PROCEDURE — 63710000001 INSULIN LISPRO (HUMAN) PER 5 UNITS: Performed by: INTERNAL MEDICINE

## 2024-03-20 PROCEDURE — 94664 DEMO&/EVAL PT USE INHALER: CPT

## 2024-03-20 PROCEDURE — 82948 REAGENT STRIP/BLOOD GLUCOSE: CPT

## 2024-03-20 PROCEDURE — 99232 SBSQ HOSP IP/OBS MODERATE 35: CPT | Performed by: INTERNAL MEDICINE

## 2024-03-20 PROCEDURE — 85610 PROTHROMBIN TIME: CPT | Performed by: INTERNAL MEDICINE

## 2024-03-20 PROCEDURE — 82948 REAGENT STRIP/BLOOD GLUCOSE: CPT | Performed by: INTERNAL MEDICINE

## 2024-03-20 PROCEDURE — 85025 COMPLETE CBC W/AUTO DIFF WBC: CPT | Performed by: INTERNAL MEDICINE

## 2024-03-20 PROCEDURE — 96376 TX/PRO/DX INJ SAME DRUG ADON: CPT

## 2024-03-20 RX ORDER — ATORVASTATIN CALCIUM 40 MG/1
80 TABLET, FILM COATED ORAL NIGHTLY
Status: DISCONTINUED | OUTPATIENT
Start: 2024-03-20 | End: 2024-03-21

## 2024-03-20 RX ORDER — DILTIAZEM HYDROCHLORIDE 240 MG/1
240 CAPSULE, COATED, EXTENDED RELEASE ORAL
Status: DISCONTINUED | OUTPATIENT
Start: 2024-03-20 | End: 2024-03-22 | Stop reason: HOSPADM

## 2024-03-20 RX ORDER — WARFARIN SODIUM 1 MG/1
1 TABLET ORAL
Status: DISCONTINUED | OUTPATIENT
Start: 2024-03-20 | End: 2024-03-20

## 2024-03-20 RX ORDER — OXYCODONE HYDROCHLORIDE 5 MG/1
5 TABLET ORAL EVERY 6 HOURS PRN
Status: DISCONTINUED | OUTPATIENT
Start: 2024-03-20 | End: 2024-03-20

## 2024-03-20 RX ORDER — METOPROLOL SUCCINATE 25 MG/1
25 TABLET, EXTENDED RELEASE ORAL
Status: DISCONTINUED | OUTPATIENT
Start: 2024-03-20 | End: 2024-03-22 | Stop reason: HOSPADM

## 2024-03-20 RX ORDER — OXYCODONE HYDROCHLORIDE 5 MG/1
5 TABLET ORAL ONCE
Status: COMPLETED | OUTPATIENT
Start: 2024-03-20 | End: 2024-03-20

## 2024-03-20 RX ORDER — OXYCODONE HYDROCHLORIDE 5 MG/1
5 TABLET ORAL EVERY 4 HOURS PRN
Status: DISCONTINUED | OUTPATIENT
Start: 2024-03-20 | End: 2024-03-21

## 2024-03-20 RX ADMIN — IPRATROPIUM BROMIDE AND ALBUTEROL SULFATE 3 ML: .5; 3 SOLUTION RESPIRATORY (INHALATION) at 07:01

## 2024-03-20 RX ADMIN — OXYCODONE 5 MG: 5 TABLET ORAL at 13:55

## 2024-03-20 RX ADMIN — IPRATROPIUM BROMIDE AND ALBUTEROL SULFATE 3 ML: .5; 3 SOLUTION RESPIRATORY (INHALATION) at 19:44

## 2024-03-20 RX ADMIN — OXYCODONE 5 MG: 5 TABLET ORAL at 15:44

## 2024-03-20 RX ADMIN — APIXABAN 5 MG: 5 TABLET, FILM COATED ORAL at 12:43

## 2024-03-20 RX ADMIN — MORPHINE SULFATE 2 MG: 2 INJECTION, SOLUTION INTRAMUSCULAR; INTRAVENOUS at 02:23

## 2024-03-20 RX ADMIN — METOPROLOL TARTRATE 2.5 MG: 1 INJECTION, SOLUTION INTRAVENOUS at 02:44

## 2024-03-20 RX ADMIN — OXYCODONE 5 MG: 5 TABLET ORAL at 22:27

## 2024-03-20 RX ADMIN — DILTIAZEM HYDROCHLORIDE 240 MG: 240 CAPSULE, COATED, EXTENDED RELEASE ORAL at 08:27

## 2024-03-20 RX ADMIN — Medication 10 ML: at 08:29

## 2024-03-20 RX ADMIN — Medication 10 ML: at 20:10

## 2024-03-20 RX ADMIN — IPRATROPIUM BROMIDE AND ALBUTEROL SULFATE 3 ML: .5; 3 SOLUTION RESPIRATORY (INHALATION) at 11:47

## 2024-03-20 RX ADMIN — INSULIN LISPRO 2 UNITS: 100 INJECTION, SOLUTION INTRAVENOUS; SUBCUTANEOUS at 12:42

## 2024-03-20 RX ADMIN — IPRATROPIUM BROMIDE AND ALBUTEROL SULFATE 3 ML: .5; 3 SOLUTION RESPIRATORY (INHALATION) at 00:59

## 2024-03-20 RX ADMIN — MORPHINE SULFATE 2 MG: 2 INJECTION, SOLUTION INTRAMUSCULAR; INTRAVENOUS at 07:57

## 2024-03-20 RX ADMIN — METOPROLOL SUCCINATE 25 MG: 25 TABLET, EXTENDED RELEASE ORAL at 08:27

## 2024-03-20 RX ADMIN — ATORVASTATIN CALCIUM 80 MG: 40 TABLET, FILM COATED ORAL at 20:09

## 2024-03-20 RX ADMIN — OXYCODONE 5 MG: 5 TABLET ORAL at 18:36

## 2024-03-20 RX ADMIN — APIXABAN 5 MG: 5 TABLET, FILM COATED ORAL at 20:09

## 2024-03-20 NOTE — NURSING NOTE
A referral has been made to hospitals as staff have [facility] [id number] I placed a call to hospitals and they are unable to admit anyone until Sat or . They will reach out to family about their next open appointment date/time. Staff report the patient is requesting to go comfort at this time. If the family chooses to DC home today, they have been notified that [facility] will not be able to see them until the weekend and I am unsure when their next appointment will be offered for an EOS. Palliative care will follow up with family tomorrow if they are still here.  Contact for the DIL is: Natalia Woodward [phone].    -Melly ESCOBAR, RN, [facility]   Palliative Care    3/20/2024 16:29 EDT

## 2024-03-20 NOTE — PLAN OF CARE
Goal Outcome Evaluation:                 Oxycodone PRN given x3 for pain. Patient wishes to speak to son about comfort care options while awaiting meeting with hospice.

## 2024-03-20 NOTE — CASE MANAGEMENT/SOCIAL WORK
Discharge Planning Assessment  DRE Marinelli     Patient Name: Jeet Ashley  MRN: [id number]  Today's Date: 3/20/2024    Admit Date: 3/19/2024    Plan: Pt lives at home with his son Alejandro. Pt states that he has good support, Pt hopes to return home at discharge. Pt stated that he plans to return home with son. Palliative care following, Hospice referral placed for [facility]. Palliative care will contact Pt's son Alejandro, SW has called prior and had to leave voicemail. SW will continue to follow for needs.   Discharge Needs Assessment       Row Name 03/20/24 1111       Living Environment    People in Home child(narcisa), adult    Current Living Arrangements home    Potentially Unsafe Housing Conditions none    In the past 12 months has the electric, gas, oil, or water company threatened to shut off services in your home? No    Primary Care Provided by self    Provides Primary Care For no one    Family Caregiver if Needed child(narcisa), adult    Quality of Family Relationships helpful;involved;supportive    Able to Return to Prior Arrangements no       Resource/Environmental Concerns    Transportation Concerns none       Transportation Needs    In the past 12 months, has lack of transportation kept you from medical appointments or from getting medications? no    In the past 12 months, has lack of transportation kept you from meetings, work, or from getting things needed for daily living? No       Food Insecurity    Within the past 12 months, you worried that your food would run out before you got the money to buy more. Never true    Within the past 12 months, the food you bought just didn't last and you didn't have money to get more. Never true       Transition Planning    Patient/Family Anticipates Transition to other (see comments)    Patient/Family Anticipated Services at Transition other (see comments)    Transportation Anticipated family or friend will provide       Discharge Needs Assessment    Readmission Within  the Last 30 Days no previous admission in last 30 days    Equipment Currently Used at Home oxygen;walker, standard    Concerns to be Addressed discharge planning    Anticipated Changes Related to Illness none    Equipment Needed After Discharge none    Discharge Coordination/Progress Pt lives at home with his son Alejandro. Pt states that he has good support, Pt hopes to return home at discharge. Pt stated that he plans to return home with son. Palliative care following, Hospice referral placed for [facility]. Palliative care will contact Pt's son Alejandro, MARY has called prior and had to leave voicemail. SW will continue to follow for needs.                   Discharge Plan       Row Name 03/20/24 1396       Plan    Plan Pt lives at home with his son Alejandro. Pt states that he has good support, Pt hopes to return home at discharge. Pt stated that he plans to return home with son. Palliative care following, Hospice referral placed for [facility]. Palliative care will contact Pt's son Alejandro, MARY has called prior and had to leave voicemail. SW will continue to follow for needs.                  Continued Care and Services - Admitted Since 3/19/2024    No active coordination exists for this encounter.          Demographic Summary       Row Name 03/20/24 1108       General Information    Admission Type observation    Arrived From emergency department    Referral Source admission list    Reason for Consult discharge planning    Preferred Language English       Contact Information    Permission Granted to Share Info With lay caregiver    Contact Information Obtained for lay caregiver       Lay Caregiver Information    Name, Lay Caregiver Alejandro Ashley    Phone, Lay Caregiver [phone]                   Functional Status       Row Name 03/20/24 1108       Functional Status    Usual Activity Tolerance good    Current Activity Tolerance good       Physical Activity    On average, how many days per week do you engage  in moderate to strenuous exercise (like a brisk walk)? 0 days    On average, how many minutes do you engage in exercise at this level? 0 min    Number of minutes of exercise per week 0       Assessment of Health Literacy    How often do you have someone help you read hospital materials? Occasionally    How often do you have problems learning about your medical condition because of difficulty understanding written information? Occasionally    How often do you have a problem understanding what is told to you about your medical condition? Occasionally    How confident are you filling out medical forms by yourself? Quite a bit    Health Literacy Good       Functional Status, IADL    Medications independent    Meal Preparation independent;assistive person    Housekeeping independent;assistive person    Laundry independent;assistive person    Shopping independent;assistive person    IADL Comments Pt lives with son Alejandro.       Mental Status    General Appearance WDL WDL       Mental Status Summary    Recent Changes in Mental Status/Cognitive Functioning no changes       Employment/    Employment Status retired                   Psychosocial    No documentation.                  Abuse/Neglect    No documentation.                  Legal       Row Name 03/20/24 1111       Financial Resource Strain    How hard is it for you to pay for the very basics like food, housing, medical care, and heating? Not very       Financial/Legal    Source of Income social security    Application for Public Assistance not applied       Legal    Criminal Activity/Legal Involvement none                   Substance Abuse    No documentation.                  Patient Forms    No documentation.                     Juanita Boyle

## 2024-03-20 NOTE — CONSULTS
Purpose of the visit was to evaluate for: goals of care/advanced care planning and pain/symptom management. Spoke with RN and patient and discussed palliative care, [facility], and clarify code status.      Assessment:The patient is on PCU on nasal canula which was off when I entered the room. The patient reports he took it off to use the urinal and forgot to put back on. The o2 was placed back on the patient. The patient was sitting on the side of the bed with a heart monitor and continuous monitor in use. The patient reports chronic pain in his lower back from 4 different surgeries and having screw. The pain goes up his spine to his shoulder blades and today has pain in bilateral axillarys. The patient has been given morphine at 0757 and twice overnight.    Recommendations/Plan: [facility] referral.    Tasks Completed: Emotional Support.    Other Comments: Palliative care spoke with the patient at the bedside, he has one living son and a daughter in law whom he lives with. The patient and family have questions about getting a biopsy and Hospice. The patient did express that he does not plan on doing any cancer treatments. I reviewed some of the patients options and he asked that I reach out to his son to discuss more and also to make a [facility] referral for the three of them, his son, MARIANA and himself could all hear the info together. I placed a call to the son Alejandro and left a message informing about my Hospice referral and to also let him know I was requesting a return call.    Another call was placed and a message left @3:54 pm.    -Melly ESCOBAR, RN, [facility]   Palliative Care    3/20/2024 11:56 EDT

## 2024-03-20 NOTE — PROGRESS NOTES
Pharmacy to Dose: Warfarin  Consulting provider: [facility]  Indication for warfarin: A-fib  Goal INR range: 2-3  Home warfarin dose: 1 mg  Actions or monitoring: Monitor for s/sx of bleeding + bruising     Date INR Warfarin Dose Given       3/19/24        1.31          2.5 mg (ordered)                         Any Vitamin K given?:No  Any major drug interactions: Tramadol  Is patient on bridging therapy with another anticoagulant?: No    Plan: Give 1 x dose of 2.5 mg ordered for 3/19 @2030. Check INR in am.

## 2024-03-20 NOTE — PROGRESS NOTES
Pharmacy to Dose: Warfarin  Consulting provider: [facility]  Indication for warfarin: A-fib  Goal INR range: 2-3  Home warfarin dose: 1 mg daily  Actions or monitoring: Monitor for s/sx of bleeding + bruising     Date INR Warfarin Dose Given       3/19/24        1.31          2.5 mg    3/20 1.32         1mg -ordered                     Any Vitamin K given?:No  Any major drug interactions: Tramadol  Is patient on bridging therapy with another anticoagulant?: No    Plan:  Will resume dose of 1mg daily. Chart review showed pts dc summary from [facility] on 3/14 was for Eliquis.  Confirmed with pt/family this had not been recently filled and last med pt was taking was warfarin.   Communicated to Dr Garcia. Will follow for any change in plan.  Daily INR      Thank you for the consult,   Eva Frausto , Pharm D, BCPS

## 2024-03-20 NOTE — PLAN OF CARE
Goal Outcome Evaluation:  Plan of Care Reviewed With: patient        Progress: no change  Outcome Evaluation: Patient c/o consistent chest, back and neck pain. Troponins trending, Morphine given x2. Patient stated chest pain has eased up but back pain is persistent. Patient was able to rest intermittently throughout the night. HR increased to 130-150 after ambulating to BR, sustained so MD notified, see orders. VSS, bed alarm set, call light within reach and education provided.

## 2024-03-20 NOTE — PROGRESS NOTES
[facility]   Hospitalist Progress Note  Date: 3/20/2024  Patient Name: Jeet Ashley  : 1950  MRN: [id number]  Date of admission: 3/19/2024  Room/Bed: 219/2      Subjective   Subjective     Chief Complaint: chest pain     Summary:Jeet Ashley is a 74 y.o. male  with past medical history of CAD, COPD, recent diagnosis of suspected metastatic disease unknown primary that presents to the emergency department for evaluation of chest pain that began earlier this morning.  He describes it as pressure, radiates towards the back and neck, constant, no known aggravating alleviating factors.  He denies any fevers, chills, sweats, nausea, vomiting, shortness of breath, palpitations, abdominal pain diarrhea constipation dysuria, weakness, rash.  Patient with minimal troponin elevation in the emergency department and will be admitted for ongoing monitoring and management.     After long discussion with patient and family, it seems they have been discussing the possibility of hospice already.  They request to speak with palliative care team prior to deciding on further aggressive management including restratification given chest pain and any potential treatments for suspected metastatic disease.    Interval Followup:   Patient is denying any chest pain or shortness of breath this morning.  Patient is aware of metastatic cancer however states that he would not want to have chemotherapy or anything done about it therefore would not want to pursue a biopsy.  Patient does have a hospice meeting with [facility] being arranged    Review of Systems    All systems reviewed and negative except for what is outlined above.      Objective   Objective     Vitals:   Temp:  [97.7 °F (36.5 °C)-98.9 °F (37.2 °C)] 98 °F (36.7 °C)  Heart Rate:  [] 102  Resp:  [16-22] 19  BP: (110-171)/() 146/89  Flow (L/min):  [3] 3    Physical Exam   General: Awake, alert, NAD  HENT: NCAT, MMM  Eyes: pupils equal, no scleral  icterus  Cardiovascular: RRR, no murmurs   Pulmonary: CTA bilaterally; no wheezes; no conversational dyspnea  Gastrointestinal: S/ND/NT, +BS  Musculoskeletal: No gross deformities  Skin: No jaundice, no rash on exposed skin appreciated  Neuro: CN II through XII grossly intact; speech clear; no tremor  Psych: Mood and affect appropriate  : No Kay catheter; no suprapubic tenderness    Result Review    Result Review:  I have personally reviewed these results:  [x]  Laboratory      Lab 03/20/24  0407 03/19/24  1426 03/15/24  0611 03/14/24  1133   WBC 10.47 10.12  --   --    HEMOGLOBIN 11.7* 11.6*  --   --    HEMATOCRIT 35.7* 35.2*  --   --    PLATELETS 229 226  --   --    NEUTROS ABS 8.50* 7.80*  --   --    IMMATURE GRANS (ABS) 0.04 0.04  --   --    LYMPHS ABS 0.72 1.12  --   --    MONOS ABS 0.96* 0.97*  --   --    EOS ABS 0.18 0.11  --   --    MCV 91.5 92.1  --   --    PROTIME 16.7* 16.5* 19.2* 18.8*   APTT  --   --  40.2* 42.6*         Lab 03/20/24  0407 03/19/24  1426 03/15/24  0611   SODIUM 135* 134*  --    POTASSIUM 3.6 3.3*  --    CHLORIDE 100 98  --    CO2 24.1 23.9  --    ANION GAP 10.9 12.1  --    BUN 10 11  --    CREATININE 0.60* 0.61*  --    EGFR 100.0 99.5  --    GLUCOSE 103* 108*  --    CALCIUM 8.9 8.9  --    MAGNESIUM  --  1.6 1.6*         Lab 03/20/24 0407 03/19/24 1426 03/14/24  1133   TOTAL PROTEIN 6.5 6.8  --    ALBUMIN 3.0* 3.1*  --    GLOBULIN 3.5 3.7  --    ALT (SGPT) 24 24  --    AST (SGOT) 26 27  --    BILIRUBIN 0.7 0.7  --    ALK PHOS 103 108  --    AMYLASE  --   --  23*   LIPASE  --  13 22         Lab 03/20/24 0407 03/19/24  2116 03/19/24  1655 03/19/24  1426 03/15/24  0611   PROBNP  --   --   --  1,943.0*  --    HSTROP T  --  23* 22* 23*  --    PROTIME 16.7*  --   --  16.5* 19.2*   INR 1.32*  --   --  1.31* 1.66*             Lab 03/15/24  0611 03/14/24  1133   FERRITIN  --  215.00   FOLATE 15.80  --    VITAMIN B 12  --  424         Brief Urine Lab Results       None          [x]   Microbiology   Microbiology Results (last 10 days)       ** No results found for the last 240 hours. **          [x]  Radiology  XR Chest 1 View    Result Date: 3/19/2024  Impression: Increased multifocal airspace opacity which may represent asymmetric edema, atelectasis or pneumonia   Electronically Signed By-Shaheed Richards On:3/19/2024 2:42 PM      CT Lumbar Spine Without Contrast    Result Date: 3/15/2024  1. No acute fracture of the lumbar spine. 2. Two very small lucent lesions in the L2 vertebral body. These are nonspecific and could be degenerative. Metastatic disease not excluded. Given their small size, biopsy is not possible.. Images reviewed, interpreted, and dictated by Leonila Meneses MD    CT Abdomen Pelvis With Contrast    Result Date: 3/14/2024  1. Dominant right liver mass suspicious for metastatic disease with a smaller hyperdense inferior right liver mass which could represent a second metastasis or atypical hemangioma. 2. No inflammatory changes in the left lower quadrant such as diverticulitis to account for left lower quadrant pain. Mild fecal impaction without bowel obstruction. 3. Pancreatic ductal dilatation which may be sequela of chronic pancreatitis. This study was performed using dose reduction techniques to achieve radiation exposure as low as reasonably achievable (ALARA)     CT chest for pulmonary embolus    Result Date: 3/14/2024  1. No evidence of pulmonary embolism. 2. Interval development of multiple pulmonary nodules with increased size of previously described right upper lobe nodularity. This is suspicious for metastatic disease. Largest nodule in the right upper lobe could represent a primary tumor 3. Significant progression of mediastinal and right hilar adenopathy 4. New small-to-moderate right pleural effusion 5. Increased interstitial markings of the right lung particularly right upper lobe may represent pneumonia or even lymphangitic tumor spread    This study was  performed using dose reduction techniques to achieve radiation exposure as low as reasonably achievable (ALARA)     XR chest 2 views    Result Date: 3/14/2024  Abnormal increased markings right lung suspicious for pneumonia. Recommend continued follow-up   []  EKG/Telemetry   []  Cardiology/Vascular   []  Pathology  []  Old records  []  Other:    Assessment & Plan   Assessment / Plan     Assessment:  Chest Pain  New diagnosis of metastatic cancer with unknown primary  COPD without exacerbation  Paroxysmal atrial fibrillation previously on Coumadin and on Eliquis?  Diabetes mellitus type 2    Plan:  At this time we will await palliative care evaluation and meeting with hospice to determine plan of care  Patient was on Coumadin and Eliquis at various times however had not been taking them.  Continue sliding scale insulin for diabetes  Resume home medication  Continue to trend troponin       Discussed with RN.    DVT prophylaxis:  Medical DVT prophylaxis orders are present.        CODE STATUS:   Code Status (Patient has no pulse and is not breathing): CPR (Attempt to Resuscitate)  Medical Interventions (Patient has pulse or is breathing): Full Support      Electronically signed by Chet Garcia DO, 3/20/2024, 10:47 EDT.

## 2024-03-21 LAB
GLUCOSE BLDC GLUCOMTR-MCNC: 107 MG/DL (ref 70–99)
GLUCOSE BLDC GLUCOMTR-MCNC: 133 MG/DL (ref 70–99)
GLUCOSE BLDC GLUCOMTR-MCNC: 136 MG/DL (ref 70–99)
GLUCOSE BLDC GLUCOMTR-MCNC: 160 MG/DL (ref 70–99)
INR PPP: 1.61 (ref 0.86–1.15)
PROTHROMBIN TIME: 19.5 SECONDS (ref 11.8–14.9)

## 2024-03-21 PROCEDURE — 96375 TX/PRO/DX INJ NEW DRUG ADDON: CPT

## 2024-03-21 PROCEDURE — 96376 TX/PRO/DX INJ SAME DRUG ADON: CPT

## 2024-03-21 PROCEDURE — 94799 UNLISTED PULMONARY SVC/PX: CPT

## 2024-03-21 PROCEDURE — 82948 REAGENT STRIP/BLOOD GLUCOSE: CPT

## 2024-03-21 PROCEDURE — 25010000002 PROCHLORPERAZINE 10 MG/2ML SOLUTION: Performed by: PHYSICIAN ASSISTANT

## 2024-03-21 PROCEDURE — G0378 HOSPITAL OBSERVATION PER HR: HCPCS

## 2024-03-21 PROCEDURE — 94664 DEMO&/EVAL PT USE INHALER: CPT

## 2024-03-21 PROCEDURE — 25010000002 ONDANSETRON PER 1 MG: Performed by: INTERNAL MEDICINE

## 2024-03-21 PROCEDURE — 99232 SBSQ HOSP IP/OBS MODERATE 35: CPT | Performed by: INTERNAL MEDICINE

## 2024-03-21 PROCEDURE — 94761 N-INVAS EAR/PLS OXIMETRY MLT: CPT

## 2024-03-21 PROCEDURE — 85610 PROTHROMBIN TIME: CPT | Performed by: INTERNAL MEDICINE

## 2024-03-21 PROCEDURE — 63710000001 INSULIN LISPRO (HUMAN) PER 5 UNITS: Performed by: INTERNAL MEDICINE

## 2024-03-21 RX ORDER — LORAZEPAM 2 MG/ML
0.5 CONCENTRATE ORAL EVERY 8 HOURS PRN
Status: DISCONTINUED | OUTPATIENT
Start: 2024-03-21 | End: 2024-03-22 | Stop reason: HOSPADM

## 2024-03-21 RX ORDER — PROCHLORPERAZINE EDISYLATE 5 MG/ML
2.5 INJECTION INTRAMUSCULAR; INTRAVENOUS EVERY 4 HOURS PRN
Status: DISCONTINUED | OUTPATIENT
Start: 2024-03-21 | End: 2024-03-22 | Stop reason: HOSPADM

## 2024-03-21 RX ORDER — MORPHINE SULFATE 10 MG/5ML
5 SOLUTION ORAL EVERY 4 HOURS PRN
Status: DISCONTINUED | OUTPATIENT
Start: 2024-03-21 | End: 2024-03-22 | Stop reason: HOSPADM

## 2024-03-21 RX ORDER — ONDANSETRON 2 MG/ML
4 INJECTION INTRAMUSCULAR; INTRAVENOUS EVERY 4 HOURS PRN
Status: DISCONTINUED | OUTPATIENT
Start: 2024-03-21 | End: 2024-03-22 | Stop reason: HOSPADM

## 2024-03-21 RX ADMIN — MORPHINE SULFATE 5 MG: 10 SOLUTION ORAL at 23:20

## 2024-03-21 RX ADMIN — OXYCODONE 5 MG: 5 TABLET ORAL at 03:56

## 2024-03-21 RX ADMIN — Medication 10 ML: at 20:40

## 2024-03-21 RX ADMIN — Medication 10 ML: at 08:01

## 2024-03-21 RX ADMIN — IPRATROPIUM BROMIDE AND ALBUTEROL SULFATE 3 ML: .5; 3 SOLUTION RESPIRATORY (INHALATION) at 00:38

## 2024-03-21 RX ADMIN — APIXABAN 5 MG: 5 TABLET, FILM COATED ORAL at 20:40

## 2024-03-21 RX ADMIN — TRAMADOL HYDROCHLORIDE 50 MG: 50 TABLET, COATED ORAL at 20:50

## 2024-03-21 RX ADMIN — TRAMADOL HYDROCHLORIDE 50 MG: 50 TABLET, COATED ORAL at 10:29

## 2024-03-21 RX ADMIN — IPRATROPIUM BROMIDE AND ALBUTEROL SULFATE 3 ML: .5; 3 SOLUTION RESPIRATORY (INHALATION) at 12:34

## 2024-03-21 RX ADMIN — ONDANSETRON 4 MG: 2 INJECTION INTRAMUSCULAR; INTRAVENOUS at 16:34

## 2024-03-21 RX ADMIN — MORPHINE SULFATE 5 MG: 10 SOLUTION ORAL at 18:57

## 2024-03-21 RX ADMIN — OXYCODONE 5 MG: 5 TABLET ORAL at 16:34

## 2024-03-21 RX ADMIN — INSULIN LISPRO 2 UNITS: 100 INJECTION, SOLUTION INTRAVENOUS; SUBCUTANEOUS at 11:57

## 2024-03-21 RX ADMIN — METOPROLOL SUCCINATE 25 MG: 25 TABLET, EXTENDED RELEASE ORAL at 08:00

## 2024-03-21 RX ADMIN — IPRATROPIUM BROMIDE AND ALBUTEROL SULFATE 3 ML: .5; 3 SOLUTION RESPIRATORY (INHALATION) at 07:31

## 2024-03-21 RX ADMIN — APIXABAN 5 MG: 5 TABLET, FILM COATED ORAL at 08:00

## 2024-03-21 RX ADMIN — OXYCODONE 5 MG: 5 TABLET ORAL at 08:00

## 2024-03-21 RX ADMIN — DILTIAZEM HYDROCHLORIDE 240 MG: 240 CAPSULE, COATED, EXTENDED RELEASE ORAL at 08:00

## 2024-03-21 RX ADMIN — ONDANSETRON 4 MG: 2 INJECTION INTRAMUSCULAR; INTRAVENOUS at 10:29

## 2024-03-21 RX ADMIN — PROCHLORPERAZINE EDISYLATE 2.5 MG: 5 INJECTION INTRAMUSCULAR; INTRAVENOUS at 20:50

## 2024-03-21 RX ADMIN — OXYCODONE 5 MG: 5 TABLET ORAL at 11:57

## 2024-03-21 NOTE — PROGRESS NOTES
[facility]   Hospitalist Progress Note  Date: 3/21/2024  Patient Name: Jeet Ashley  : 1950  MRN: [id number]  Date of admission: 3/19/2024  Room/Bed: 219/2      Subjective   Subjective     Chief Complaint: chest pain     Summary:Jeet Ashley is a 74 y.o. male  with past medical history of CAD, COPD, recent diagnosis of suspected metastatic disease unknown primary that presents to the emergency department for evaluation of chest pain that began earlier this morning.  He describes it as pressure, radiates towards the back and neck, constant, no known aggravating alleviating factors.  He denies any fevers, chills, sweats, nausea, vomiting, shortness of breath, palpitations, abdominal pain diarrhea constipation dysuria, weakness, rash.  Patient with minimal troponin elevation in the emergency department and will be admitted for ongoing monitoring and management.     After long discussion with patient and family, it seems they have been discussing the possibility of hospice already.  They request to speak with palliative care team prior to deciding on further aggressive management including restratification given chest pain and any potential treatments for suspected metastatic disease.    Interval Followup:   Patient and his family have met with hospice and decided to enroll in hospice and discharge home tomorrow with hospice services.  Patient's son will be taking care of him.  Patient states that he is having 7 out of 10 pain today despite getting oxycodone.  He states it hurts him in his low back.  Patient is very anxious to go home.    Review of Systems    All systems reviewed and negative except for what is outlined above.      Objective   Objective     Vitals:   Temp:  [97.3 °F (36.3 °C)-98.1 °F (36.7 °C)] 97.3 °F (36.3 °C)  Heart Rate:  [74-98] 80  Resp:  [10-20] 18  BP: (108-137)/(53-80) 108/71  Flow (L/min):  [2-3] 2    Physical Exam   General: Awake, alert, NAD resting in bed  HENT: NCAT,  MMM  Eyes: pupils equal, no scleral icterus  Cardiovascular: RRR, no murmurs   Pulmonary: CTA bilaterally; no wheezes; no conversational dyspnea  Gastrointestinal: S/ND/NT, +BS  Musculoskeletal: Full range of motion  Skin: No jaundice, no rash on exposed skin appreciated  Neuro: CN II through XII grossly intact; speech clear; no tremor  Psych: Mood and affect appropriate  : No Kay catheter; no suprapubic tenderness    Result Review    Result Review:  I have personally reviewed these results:  [x]  Laboratory      Lab 03/21/24  0458 03/20/24  0407 03/19/24  1426 03/15/24  0611   WBC  --  10.47 10.12  --    HEMOGLOBIN  --  11.7* 11.6*  --    HEMATOCRIT  --  35.7* 35.2*  --    PLATELETS  --  229 226  --    NEUTROS ABS  --  8.50* 7.80*  --    IMMATURE GRANS (ABS)  --  0.04 0.04  --    LYMPHS ABS  --  0.72 1.12  --    MONOS ABS  --  0.96* 0.97*  --    EOS ABS  --  0.18 0.11  --    MCV  --  91.5 92.1  --    PROTIME 19.5* 16.7* 16.5* 19.2*   APTT  --   --   --  40.2*         Lab 03/20/24  0407 03/19/24  1426 03/15/24  0611   SODIUM 135* 134*  --    POTASSIUM 3.6 3.3*  --    CHLORIDE 100 98  --    CO2 24.1 23.9  --    ANION GAP 10.9 12.1  --    BUN 10 11  --    CREATININE 0.60* 0.61*  --    EGFR 100.0 99.5  --    GLUCOSE 103* 108*  --    CALCIUM 8.9 8.9  --    MAGNESIUM  --  1.6 1.6*         Lab 03/20/24 0407 03/19/24  1426   TOTAL PROTEIN 6.5 6.8   ALBUMIN 3.0* 3.1*   GLOBULIN 3.5 3.7   ALT (SGPT) 24 24   AST (SGOT) 26 27   BILIRUBIN 0.7 0.7   ALK PHOS 103 108   LIPASE  --  13         Lab 03/21/24 0458 03/20/24 0407 03/19/24  2116 03/19/24  1655 03/19/24  1426   PROBNP  --   --   --   --  1,943.0*   HSTROP T  --   --  23* 22* 23*   PROTIME 19.5* 16.7*  --   --  16.5*   INR 1.61* 1.32*  --   --  1.31*             Lab 03/15/24  0611   FOLATE 15.80         Brief Urine Lab Results       None          [x]  Microbiology   Microbiology Results (last 10 days)       ** No results found for the last 240 hours. **          [x]   Radiology  XR Chest 1 View    Result Date: 3/19/2024  Impression: Increased multifocal airspace opacity which may represent asymmetric edema, atelectasis or pneumonia   Electronically Signed By-Shaheed Richards On:3/19/2024 2:42 PM      CT Lumbar Spine Without Contrast    Result Date: 3/15/2024  1. No acute fracture of the lumbar spine. 2. Two very small lucent lesions in the L2 vertebral body. These are nonspecific and could be degenerative. Metastatic disease not excluded. Given their small size, biopsy is not possible.. Images reviewed, interpreted, and dictated by Leonila Meneses MD    CT Abdomen Pelvis With Contrast    Result Date: 3/14/2024  1. Dominant right liver mass suspicious for metastatic disease with a smaller hyperdense inferior right liver mass which could represent a second metastasis or atypical hemangioma. 2. No inflammatory changes in the left lower quadrant such as diverticulitis to account for left lower quadrant pain. Mild fecal impaction without bowel obstruction. 3. Pancreatic ductal dilatation which may be sequela of chronic pancreatitis. This study was performed using dose reduction techniques to achieve radiation exposure as low as reasonably achievable (ALARA)     CT chest for pulmonary embolus    Result Date: 3/14/2024  1. No evidence of pulmonary embolism. 2. Interval development of multiple pulmonary nodules with increased size of previously described right upper lobe nodularity. This is suspicious for metastatic disease. Largest nodule in the right upper lobe could represent a primary tumor 3. Significant progression of mediastinal and right hilar adenopathy 4. New small-to-moderate right pleural effusion 5. Increased interstitial markings of the right lung particularly right upper lobe may represent pneumonia or even lymphangitic tumor spread    This study was performed using dose reduction techniques to achieve radiation exposure as low as reasonably achievable (ALARA)     XR chest  2 views    Result Date: 3/14/2024  Abnormal increased markings right lung suspicious for pneumonia. Recommend continued follow-up   []  EKG/Telemetry   []  Cardiology/Vascular   []  Pathology  []  Old records  []  Other:    Assessment & Plan   Assessment / Plan     Assessment:  Chest Pain  New diagnosis of metastatic cancer with unknown primary  COPD without exacerbation  Paroxysmal atrial fibrillation previously on Coumadin and on Eliquis?  Diabetes mellitus type 2    Plan:  Plan to discharge patient home with hospice tomorrow   Will change oxycodone to morphine for better pain control  Can d/c tele and transfer to regular room      Discussed with RN.    DVT prophylaxis:  Medical DVT prophylaxis orders are present.        CODE STATUS:   Medical Intervention Limits: NO intubation (DNI)  Code Status (Patient has no pulse and is not breathing): No CPR (Do Not Attempt to Resuscitate)  Medical Interventions (Patient has pulse or is breathing): Limited Support      Electronically signed by Chet Garcia DO, 3/21/2024, 13:12 EDT.

## 2024-03-21 NOTE — PLAN OF CARE
Goal Outcome Evaluation:   VSS. Pain managed per mar. Orders in to be transferred off unit. Plan to d/c to [facility] home tomorrow.

## 2024-03-21 NOTE — NURSING NOTE
"[facility] Visit scheduled with Sabina today at 11:30 am.    Update: Per [facility] Nurse Sabina, \"Mr. Ashley ([id number]) and family wish for him to be DC home tomorrow and pursue our services. He will be going to son's residence. I have talked with staff RN and she said she would make sure they do meds to bed for him. I have placed comment in patient's chart regarding DME. Patient will be going home by private vehicle.\"     -Melly ESCOBAR, RN, [facility]   Palliative Care    3/21/2024 08:16 EDT    "

## 2024-03-22 VITALS
SYSTOLIC BLOOD PRESSURE: 104 MMHG | TEMPERATURE: 98.6 F | RESPIRATION RATE: 18 BRPM | HEART RATE: 81 BPM | OXYGEN SATURATION: 97 % | DIASTOLIC BLOOD PRESSURE: 76 MMHG | BODY MASS INDEX: 21.46 KG/M2 | WEIGHT: 149.91 LBS | HEIGHT: 70 IN

## 2024-03-22 PROBLEM — C79.51 MALIGNANT NEOPLASM METASTATIC TO BONE: Status: ACTIVE | Noted: 2024-03-22

## 2024-03-22 PROBLEM — E11.9 TYPE 2 DIABETES MELLITUS WITHOUT COMPLICATION, WITHOUT LONG-TERM CURRENT USE OF INSULIN: Status: ACTIVE | Noted: 2024-03-22

## 2024-03-22 LAB
GLUCOSE BLDC GLUCOMTR-MCNC: 119 MG/DL (ref 70–99)
GLUCOSE BLDC GLUCOMTR-MCNC: 143 MG/DL (ref 70–99)
INR PPP: 1.57 (ref 0.86–1.15)
PROTHROMBIN TIME: 19.1 SECONDS (ref 11.8–14.9)
WHOLE BLOOD HOLD SPECIMEN: NORMAL

## 2024-03-22 PROCEDURE — 82948 REAGENT STRIP/BLOOD GLUCOSE: CPT | Performed by: INTERNAL MEDICINE

## 2024-03-22 PROCEDURE — 99239 HOSP IP/OBS DSCHRG MGMT >30: CPT | Performed by: INTERNAL MEDICINE

## 2024-03-22 PROCEDURE — 85610 PROTHROMBIN TIME: CPT | Performed by: INTERNAL MEDICINE

## 2024-03-22 PROCEDURE — G0378 HOSPITAL OBSERVATION PER HR: HCPCS

## 2024-03-22 RX ORDER — MORPHINE SULFATE 100 MG/5ML
5 SOLUTION ORAL EVERY 4 HOURS PRN
Qty: 30 ML | Refills: 0 | Status: SHIPPED | OUTPATIENT
Start: 2024-03-22

## 2024-03-22 RX ADMIN — MORPHINE SULFATE 5 MG: 10 SOLUTION ORAL at 10:09

## 2024-03-22 RX ADMIN — APIXABAN 5 MG: 5 TABLET, FILM COATED ORAL at 09:20

## 2024-03-22 RX ADMIN — METOPROLOL SUCCINATE 25 MG: 25 TABLET, EXTENDED RELEASE ORAL at 09:20

## 2024-03-22 RX ADMIN — MORPHINE SULFATE 5 MG: 10 SOLUTION ORAL at 04:50

## 2024-03-22 RX ADMIN — MORPHINE SULFATE 5 MG: 10 SOLUTION ORAL at 14:09

## 2024-03-22 RX ADMIN — DILTIAZEM HYDROCHLORIDE 240 MG: 240 CAPSULE, COATED, EXTENDED RELEASE ORAL at 09:20

## 2024-03-22 RX ADMIN — Medication 10 ML: at 09:20

## 2024-03-22 NOTE — PLAN OF CARE
Goal Outcome Evaluation:              Outcome Evaluation: patient alert and oriented x4. medicated per orders. frequently turned and repostioned. discharge education provided. oxygen sent home with patient. discharge home via private car with family. no new issues/needs at this time.

## 2024-03-22 NOTE — DISCHARGE SUMMARY
[facility]         HOSPITALIST  DISCHARGE SUMMARY    Patient Name: Jeet Ashley  : 1950  MRN: [id number]    Date of Admission: 3/19/2024  Date of Discharge:  3/22/2024    Primary Care Physician: Provider, No Known    Consults       Date and Time Order Name Status Description    3/19/2024  4:16 PM Inpatient Hospitalist Consult              Active and Resolved Hospital Problems:  Active Hospital Problems    Diagnosis POA    **CHF (congestive heart failure) [I50.9] Yes    Malignant neoplasm metastatic to bone [C79.51] Unknown    Type 2 diabetes mellitus without complication, without long-term current use of insulin [E11.9] Unknown      Resolved Hospital Problems   No resolved problems to display.       Hospital Course     Hospital Course:  Jeet Ashley is a 74 y.o. male  with past medical history of CAD, COPD, recent diagnosis of suspected metastatic disease unknown primary that presented to the emergency department for evaluation of chest pain that began earlier this morning.  He describes it as pressure, radiates towards the back and neck, constant, no known aggravating alleviating factors.  He denies any fevers, chills, sweats, nausea, vomiting, shortness of breath, palpitations, abdominal pain diarrhea constipation dysuria, weakness, rash.  Patient with minimal troponin elevation in the emergency department and will be admitted for ongoing monitoring and management.  After long discussion with patient and family, it seems they have been discussing the possibility of hospice already.  They requested to speak with palliative care team.  Patient and his family met with hospice and at this time have decided to go home with hospice.  Patient is aware that he has what appears to be metastatic cancer however he does not want a biopsy because he does not want chemotherapy or anything further to be done.  Patient will be discharged home with hospice today.  Will change his oral oxycodone over  to oral morphine for pain control.  Currently he is only requiring pain control.  Patient was not on oxycodone long-term this was just a one-time medication that had been given to him.  Patient discharged home with his son in stable condition. He can continue on his home medications as he sees fit  His coumadin was changed to Eliquis however this can also be discontinued if he does not want to take any medication since he will be in hospice.       Day of Discharge     Vital Signs:  Temp:  [97.5 °F (36.4 °C)-99.3 °F (37.4 °C)] 98.2 °F (36.8 °C)  Heart Rate:  [71-95] 95  Resp:  [18] 18  BP: (100-137)/(57-76) 132/73  Flow (L/min):  [2] 2  Physical Exam:   General: Awake, alert, NAD resting in bed   HENT: NCAT, MMM  Eyes: pupils equal, no scleral icterus  Cardiovascular: RRR, no murmurs   Pulmonary: CTA bilaterally; no wheezes; no conversational dyspnea  Gastrointestinal: S/ND/NT, +BS  Musculoskeletal: No gross deformities  Skin: No jaundice, no rash on exposed skin appreciated  Neuro: CN II through XII grossly intact; speech clear; no tremor  Psych: Mood and affect appropriate  : No Kay catheter; no suprapubic tenderness      Discharge Details        Discharge Medications        New Medications        Instructions Start Date   apixaban 5 MG tablet tablet  Commonly known as: ELIQUIS   5 mg, Oral, Every 12 Hours Scheduled      morphine 20 MG/ML concentrated solution   5 mg, Oral, Every 4 Hours PRN             Continue These Medications        Instructions Start Date   albuterol sulfate  (90 Base) MCG/ACT inhaler  Commonly known as: PROVENTIL HFA;VENTOLIN HFA;PROAIR HFA   2 puffs, Inhalation, Every 6 Hours PRN      dilTIAZem  MG 24 hr capsule  Commonly known as: CARDIZEM CD   1 capsule, Oral, Daily      metoprolol succinate XL 25 MG 24 hr tablet  Commonly known as: TOPROL-XL   1 tablet, Oral, Daily      sennosides-docusate 8.6-50 MG per tablet  Commonly known as: PERICOLACE   1 tablet, Oral, 2 Times  Daily             Stop These Medications      amitriptyline 75 MG tablet  Commonly known as: ELAVIL     aspirin 81 MG EC tablet     atorvastatin 80 MG tablet  Commonly known as: LIPITOR     Jantoven 1 MG tablet  Generic drug: warfarin     losartan 50 MG tablet  Commonly known as: COZAAR     magnesium oxide 400 MG tablet  Commonly known as: MAG-OX     meloxicam 15 MG tablet  Commonly known as: MOBIC     metFORMIN 500 MG tablet  Commonly known as: GLUCOPHAGE     oxyCODONE 5 MG immediate release tablet  Commonly known as: ROXICODONE              Allergies   Allergen Reactions    Toradol [Ketorolac Tromethamine] GI Intolerance       Discharge Disposition:  Hospice/Home    Diet:  Hospital:  Diet Order   Procedures    Diet: Cardiac, Diabetic; Healthy Heart (2-3 Na+); Consistent Carbohydrate; Fluid Consistency: Thin (IDDSI 0)       Discharge Activity: as tolerated       CODE STATUS:  Code Status and Medical Interventions:   Ordered at: 03/20/24 1218     Medical Intervention Limits:    NO intubation (DNI)     Code Status (Patient has no pulse and is not breathing):    No CPR (Do Not Attempt to Resuscitate)     Medical Interventions (Patient has pulse or is breathing):    Limited Support         No future appointments.        Pertinent  and/or Most Recent Results     PROCEDURES:   None     LAB RESULTS:      Lab 03/22/24  0530 03/21/24  0458 03/20/24  0407 03/19/24  1426   WBC  --   --  10.47 10.12   HEMOGLOBIN  --   --  11.7* 11.6*   HEMATOCRIT  --   --  35.7* 35.2*   PLATELETS  --   --  229 226   NEUTROS ABS  --   --  8.50* 7.80*   IMMATURE GRANS (ABS)  --   --  0.04 0.04   LYMPHS ABS  --   --  0.72 1.12   MONOS ABS  --   --  0.96* 0.97*   EOS ABS  --   --  0.18 0.11   MCV  --   --  91.5 92.1   PROTIME 19.1* 19.5* 16.7* 16.5*         Lab 03/20/24  0407 03/19/24  1426   SODIUM 135* 134*   POTASSIUM 3.6 3.3*   CHLORIDE 100 98   CO2 24.1 23.9   ANION GAP 10.9 12.1   BUN 10 11   CREATININE 0.60* 0.61*   EGFR 100.0 99.5    GLUCOSE 103* 108*   CALCIUM 8.9 8.9   MAGNESIUM  --  1.6         Lab 03/20/24  0407 03/19/24  1426   TOTAL PROTEIN 6.5 6.8   ALBUMIN 3.0* 3.1*   GLOBULIN 3.5 3.7   ALT (SGPT) 24 24   AST (SGOT) 26 27   BILIRUBIN 0.7 0.7   ALK PHOS 103 108   LIPASE  --  13         Lab 03/22/24  0530 03/21/24  0458 03/20/24  0407 03/19/24  2116 03/19/24  1655 03/19/24  1426   PROBNP  --   --   --   --   --  1,943.0*   HSTROP T  --   --   --  23* 22* 23*   PROTIME 19.1* 19.5* 16.7*  --   --  16.5*   INR 1.57* 1.61* 1.32*  --   --  1.31*                 Brief Urine Lab Results       None          Microbiology Results (last 10 days)       ** No results found for the last 240 hours. **            XR Chest 1 View    Result Date: 3/19/2024  Impression: Increased multifocal airspace opacity which may represent asymmetric edema, atelectasis or pneumonia   Electronically Signed By-Shaheed Richards On:3/19/2024 2:42 PM      CT Lumbar Spine Without Contrast    Result Date: 3/15/2024  1. No acute fracture of the lumbar spine. 2. Two very small lucent lesions in the L2 vertebral body. These are nonspecific and could be degenerative. Metastatic disease not excluded. Given their small size, biopsy is not possible.. Images reviewed, interpreted, and dictated by Leonila Meneses MD    CT Abdomen Pelvis With Contrast    Result Date: 3/14/2024  1. Dominant right liver mass suspicious for metastatic disease with a smaller hyperdense inferior right liver mass which could represent a second metastasis or atypical hemangioma. 2. No inflammatory changes in the left lower quadrant such as diverticulitis to account for left lower quadrant pain. Mild fecal impaction without bowel obstruction. 3. Pancreatic ductal dilatation which may be sequela of chronic pancreatitis. This study was performed using dose reduction techniques to achieve radiation exposure as low as reasonably achievable (ALARA)     CT chest for pulmonary embolus    Result Date: 3/14/2024  1. No  evidence of pulmonary embolism. 2. Interval development of multiple pulmonary nodules with increased size of previously described right upper lobe nodularity. This is suspicious for metastatic disease. Largest nodule in the right upper lobe could represent a primary tumor 3. Significant progression of mediastinal and right hilar adenopathy 4. New small-to-moderate right pleural effusion 5. Increased interstitial markings of the right lung particularly right upper lobe may represent pneumonia or even lymphangitic tumor spread    This study was performed using dose reduction techniques to achieve radiation exposure as low as reasonably achievable (ALARA)     XR chest 2 views    Result Date: 3/14/2024  Abnormal increased markings right lung suspicious for pneumonia. Recommend continued follow-up                  Labs Pending at Discharge:        Time spent on Discharge including face to face service:  more than 35 minutes    Electronically signed by Chet Garcia DO, 03/22/24, 11:42 AM EDT.

## 2024-03-22 NOTE — PLAN OF CARE
Problem: Adult Inpatient Plan of Care  Goal: Plan of Care Review  Outcome: Ongoing, Progressing  Flowsheets (Taken 3/22/2024 0545)  Progress: improving  Plan of Care Reviewed With: patient  Outcome Evaluation: Pt. A&Ox4. Resting in bed at this time. Pain meds given per EMAR. Pt. states pain is somewhat improved with medication but cont. present. Bed alarm in place for safety. Call light w/ in reach. Plan for pt. to d/c home w/ hospice today.  Goal: Patient-Specific Goal (Individualized)  Outcome: Ongoing, Progressing  Goal: Absence of Hospital-Acquired Illness or Injury  Outcome: Ongoing, Progressing  Intervention: Identify and Manage Fall Risk  Recent Flowsheet Documentation  Taken 3/21/2024 2045 by Yang Montana RN  Safety Promotion/Fall Prevention: safety round/check completed  Intervention: Prevent Skin Injury  Recent Flowsheet Documentation  Taken 3/21/2024 2045 by Yang Montana RN  Body Position: position changed independently  Intervention: Prevent and Manage VTE (Venous Thromboembolism) Risk  Recent Flowsheet Documentation  Taken 3/21/2024 2045 by Yang Montana RN  Activity Management: activity encouraged  VTE Prevention/Management: (See MAR.) other (see comments)  Goal: Optimal Comfort and Wellbeing  Outcome: Ongoing, Progressing  Intervention: Monitor Pain and Promote Comfort  Recent Flowsheet Documentation  Taken 3/22/2024 0450 by Yang Montana RN  Pain Management Interventions: see MAR  Taken 3/21/2024 2315 by Yang Montana RN  Pain Management Interventions: see MAR  Taken 3/21/2024 2045 by Yang Montana RN  Pain Management Interventions: see MAR  Goal: Readiness for Transition of Care  Outcome: Ongoing, Progressing     Problem: Skin Injury Risk Increased  Goal: Skin Health and Integrity  Outcome: Ongoing, Progressing  Intervention: Optimize Skin Protection  Recent Flowsheet Documentation  Taken 3/21/2024 2045 by Yang Montana, RN  Head of Bed (HOB) Positioning: HOB elevated      Problem: Palliative Care  Goal: Enhanced Quality of Life  Outcome: Ongoing, Progressing  Intervention: Maximize Comfort  Recent Flowsheet Documentation  Taken 3/22/2024 0450 by Yang Montana, RN  Pain Management Interventions: see MAR  Taken 3/21/2024 2315 by Yang Montana, RN  Pain Management Interventions: see MAR  Taken 3/21/2024 2045 by Yang Montana, RN  Pain Management Interventions: see MAR     Problem: Fall Injury Risk  Goal: Absence of Fall and Fall-Related Injury  Outcome: Ongoing, Progressing  Intervention: Promote Injury-Free Environment  Recent Flowsheet Documentation  Taken 3/21/2024 2045 by Yang Montana, RN  Safety Promotion/Fall Prevention: safety round/check completed   Goal Outcome Evaluation:  Plan of Care Reviewed With: patient        Progress: improving  Outcome Evaluation: Pt. A&Ox4. Resting in bed at this time. Pain meds given per EMAR. Pt. states pain is somewhat improved with medication but cont. present. Bed alarm in place for safety. Call light w/ in reach. Plan for pt. to d/c home w/ hospice today.

## 2024-03-22 NOTE — NURSING NOTE
Jonny notified palliative care that the DME is in the home and patient is able to discharge home now.  Notified RN and asked her to make sure comfort medications are obtained at [facility] pharmacy or by meds to bed before leaving [facility].    Rose ESCOBAR, RN, CHPN